# Patient Record
Sex: FEMALE | Race: WHITE | NOT HISPANIC OR LATINO | Employment: OTHER | URBAN - METROPOLITAN AREA
[De-identification: names, ages, dates, MRNs, and addresses within clinical notes are randomized per-mention and may not be internally consistent; named-entity substitution may affect disease eponyms.]

---

## 2023-05-30 ENCOUNTER — OFFICE VISIT (OUTPATIENT)
Dept: URGENT CARE | Facility: CLINIC | Age: 83
End: 2023-05-30
Payer: MEDICARE

## 2023-05-30 VITALS
WEIGHT: 135 LBS | RESPIRATION RATE: 18 BRPM | OXYGEN SATURATION: 94 % | TEMPERATURE: 98 F | DIASTOLIC BLOOD PRESSURE: 90 MMHG | HEIGHT: 63 IN | HEART RATE: 69 BPM | SYSTOLIC BLOOD PRESSURE: 126 MMHG | BODY MASS INDEX: 23.92 KG/M2

## 2023-05-30 DIAGNOSIS — R11.2 NAUSEA AND VOMITING, UNSPECIFIED VOMITING TYPE: ICD-10-CM

## 2023-05-30 DIAGNOSIS — N30.90 CYSTITIS: ICD-10-CM

## 2023-05-30 LAB
APPEARANCE UR: NORMAL
BILIRUB UR STRIP-MCNC: NEGATIVE MG/DL
COLOR UR AUTO: YELLOW
GLUCOSE UR STRIP.AUTO-MCNC: NEGATIVE MG/DL
KETONES UR STRIP.AUTO-MCNC: 15 MG/DL
LEUKOCYTE ESTERASE UR QL STRIP.AUTO: NORMAL
NITRITE UR QL STRIP.AUTO: POSITIVE
PH UR STRIP.AUTO: 6.5 [PH] (ref 5–8)
PROT UR QL STRIP: 100 MG/DL
RBC UR QL AUTO: NORMAL
SP GR UR STRIP.AUTO: 1.02
UROBILINOGEN UR STRIP-MCNC: 0.2 MG/DL

## 2023-05-30 PROCEDURE — 81002 URINALYSIS NONAUTO W/O SCOPE: CPT

## 2023-05-30 PROCEDURE — 3080F DIAST BP >= 90 MM HG: CPT

## 2023-05-30 PROCEDURE — 3074F SYST BP LT 130 MM HG: CPT

## 2023-05-30 PROCEDURE — 99203 OFFICE O/P NEW LOW 30 MIN: CPT

## 2023-05-30 RX ORDER — DONEPEZIL HYDROCHLORIDE 10 MG/1
TABLET, FILM COATED ORAL
COMMUNITY
Start: 2023-04-07 | End: 2023-06-01

## 2023-05-30 RX ORDER — ASPIRIN 81 MG/1
81 TABLET ORAL DAILY
COMMUNITY
Start: 2022-07-07 | End: 2023-05-30

## 2023-05-30 RX ORDER — ONDANSETRON 4 MG/1
4 TABLET, ORALLY DISINTEGRATING ORAL ONCE
Status: COMPLETED | OUTPATIENT
Start: 2023-05-30 | End: 2023-05-30

## 2023-05-30 RX ORDER — CARVEDILOL 12.5 MG/1
12.5 TABLET ORAL 2 TIMES DAILY
COMMUNITY
Start: 2023-04-12 | End: 2023-07-26

## 2023-05-30 RX ORDER — ONDANSETRON HYDROCHLORIDE 8 MG/1
8 TABLET, FILM COATED ORAL EVERY 8 HOURS PRN
COMMUNITY
Start: 2023-04-14 | End: 2023-07-26 | Stop reason: SDUPTHER

## 2023-05-30 RX ORDER — DONEPEZIL HYDROCHLORIDE 10 MG/1
10 TABLET, FILM COATED ORAL NIGHTLY
COMMUNITY
Start: 2023-01-31 | End: 2023-07-26

## 2023-05-30 RX ORDER — NAPROXEN 500 MG/1
500 TABLET ORAL 2 TIMES DAILY WITH MEALS
COMMUNITY
Start: 2023-05-04 | End: 2023-07-26

## 2023-05-30 RX ORDER — NAPROXEN 500 MG/1
TABLET ORAL
COMMUNITY
Start: 2023-05-16 | End: 2023-06-01

## 2023-05-30 RX ORDER — AMLODIPINE BESYLATE 5 MG/1
10 TABLET ORAL DAILY
COMMUNITY
Start: 2023-03-14 | End: 2023-06-01

## 2023-05-30 RX ORDER — OLOPATADINE HYDROCHLORIDE 1 MG/ML
1 SOLUTION/ DROPS OPHTHALMIC
COMMUNITY
Start: 2022-08-05 | End: 2023-05-30

## 2023-05-30 RX ORDER — PSEUDOEPHEDRINE HCL 30 MG
100 TABLET ORAL
COMMUNITY
Start: 2022-07-07 | End: 2023-05-30

## 2023-05-30 RX ORDER — AMITRIPTYLINE HYDROCHLORIDE 25 MG/1
25 TABLET, FILM COATED ORAL NIGHTLY
COMMUNITY
Start: 2023-04-07 | End: 2023-07-26

## 2023-05-30 RX ORDER — CEPHALEXIN 500 MG/1
500 CAPSULE ORAL 2 TIMES DAILY
Qty: 14 CAPSULE | Refills: 0 | Status: SHIPPED | OUTPATIENT
Start: 2023-05-30 | End: 2023-06-06

## 2023-05-30 RX ORDER — NYSTATIN 100000 [USP'U]/G
POWDER TOPICAL
COMMUNITY
Start: 2022-07-07 | End: 2023-05-30

## 2023-05-30 RX ORDER — AMLODIPINE BESYLATE 5 MG/1
TABLET ORAL
COMMUNITY
Start: 2023-04-20 | End: 2023-06-01

## 2023-05-30 RX ADMIN — ONDANSETRON 4 MG: 4 TABLET, ORALLY DISINTEGRATING ORAL at 14:13

## 2023-05-30 NOTE — PROGRESS NOTES
Chief Complaint   Patient presents with    Diarrhea     X1 day, vomiting        HISTORY OF PRESENT ILLNESS: Patient is a pleasant 82 y.o. female who presents to urgent care today noted ongoing abdominal pain with nausea and vomiting for the last day.  She denies any current diarrhea.  No noted fevers.  She denies any pain with urination, however she does note that she is incontinent.  Has not taken anything for any of her symptoms, ongoing history of a UTI    There are no problems to display for this patient.      Allergies:Patient has no known allergies.    Current Outpatient Medications Ordered in Epic   Medication Sig Dispense Refill    ondansetron (ZOFRAN) 8 MG Tab       naproxen (NAPROSYN) 500 MG Tab TAKE 1 TABLET(500 MG) BY MOUTH TWICE DAILY WITH MEALS      naproxen (NAPROSYN) 500 MG Tab       donepezil (ARICEPT) 10 MG tablet TAKE 1 TABLET(10 MG) BY MOUTH DAILY IN THE EVENING      donepezil (ARICEPT) 10 MG tablet       carvedilol (COREG) 12.5 MG Tab       amLODIPine (NORVASC) 5 MG Tab Take 10 mg by mouth every day.      amLODIPine (NORVASC) 5 MG Tab       cephALEXin (KEFLEX) 500 MG Cap Take 1 Capsule by mouth 2 times a day for 7 days. 14 Capsule 0    amitriptyline (ELAVIL) 25 MG Tab        No current Epic-ordered facility-administered medications on file.       History reviewed. No pertinent past medical history.    Social History     Tobacco Use    Smoking status: Never    Smokeless tobacco: Never   Vaping Use    Vaping Use: Never used   Substance Use Topics    Alcohol use: Not Currently    Drug use: Never       No family status information on file.   History reviewed. No pertinent family history.    ROS:  Review of Systems   Constitutional: Negative for fever, chills, weight loss, malaise, and fatigue.   HENT: Negative for ear pain, nosebleeds, congestion, sore throat and neck pain.    Eyes: Negative for vision changes.   Neuro: Negative for headache, sensory changes, weakness, seizure, LOC.  "  Cardiovascular: Negative for chest pain, palpitations, orthopnea and leg swelling.   Respiratory: Negative for cough, sputum production, shortness of breath and wheezing.   Gastrointestinal: Positive for abdominal pain, positive nausea, vomiting x1, negative diarrhea.   Genitourinary: Negative for dysuria, urgency and frequency.  Positive incontinence  Musculoskeletal: Negative for falls, neck pain, back pain, joint pain, myalgias.   Skin: Negative for rash, diaphoresis.     Exam:  BP (!) 126/90   Pulse 69   Temp 36.7 °C (98 °F) (Temporal)   Resp 18   Ht 1.6 m (5' 3\")   Wt 61.2 kg (135 lb)   SpO2 94%   General: well-nourished, well-developed female in NAD  Head: normocephalic, atraumatic  Eyes: PERRLA, no conjunctival injection, acuity grossly intact, lids normal.  Ears: normal shape and symmetry, no tenderness, no discharge. External canals are without any significant edema or erythema. Tympanic membranes are without any inflammation, no effusion. Gross auditory acuity is intact.  Nose: symmetrical without tenderness, no discharge.  Mouth/Throat: reasonable hygiene, no erythema, exudates or tonsillar enlargement.  Neck: no masses, range of motion within normal limits, no tracheal deviation. No obvious thyroid enlargement.   Lymph: no cervical adenopathy. No supraclavicular adenopathy.   Neuro: alert and oriented. Cranial nerves 1-12 grossly intact. No sensory deficit.   Cardiovascular: regular rate and rhythm. No edema.  Pulmonary: no distress. Chest is symmetrical with respiration, no wheezes, crackles, or rhonchi.   Abdomen: soft, non-tender, no guarding, no hepatosplenomegaly.  Urine is foul-smelling, negative CVA tenderness  Musculoskeletal: no clubbing, appropriate muscle tone, gait is stable.  Skin: warm, dry, intact, no clubbing, no cyanosis, no rashes.   Psych: appropriate mood, affect, judgement.         Assessment/Plan:  1. Nausea and vomiting, unspecified vomiting type  POCT Urinalysis    " ondansetron (ZOFRAN ODT) dispertab 4 mg      2. Cystitis  cephALEXin (KEFLEX) 500 MG Cap      This is a 82-year-old female who comes in today with ongoing complaints of nausea and vomiting for the last day.  She notes a previous history of ongoing UTIs.  She is currently incontinent of urine denies any other symptoms otherwise.  POCT urinalysis was done, shows positive leukocytes, blood, nitrates patient was treated with a course of Keflex, Zofran given here in the office today for her ongoing nausea.  Patient advised that if she does not improve or continues to get worse to please seek further evaluation, fluids were encouraged.        Supportive care, differential diagnoses, and indications for immediate follow-up discussed with patient.   Pathogenesis of diagnosis discussed including typical length and natural progression.   Instructed to return to clinic or nearest emergency department for any change in condition, further concerns, or worsening of symptoms.  Patient states understanding of the plan of care and discharge instructions.  Instructed to make an appointment, for follow up, with  primary care provider.        Please note that this dictation was created using voice recognition software. I have made every reasonable attempt to correct obvious errors, but I expect that there are errors of grammar and possibly content that I did not discover before finalizing the note.      Betty TRINIDAD

## 2023-06-01 ENCOUNTER — OFFICE VISIT (OUTPATIENT)
Dept: MEDICAL GROUP | Facility: MEDICAL CENTER | Age: 83
End: 2023-06-01
Payer: MEDICARE

## 2023-06-01 VITALS
TEMPERATURE: 98.8 F | HEIGHT: 63 IN | DIASTOLIC BLOOD PRESSURE: 70 MMHG | SYSTOLIC BLOOD PRESSURE: 130 MMHG | BODY MASS INDEX: 23.04 KG/M2 | WEIGHT: 130 LBS | OXYGEN SATURATION: 94 % | HEART RATE: 68 BPM | RESPIRATION RATE: 17 BRPM

## 2023-06-01 DIAGNOSIS — R73.03 PREDIABETES: ICD-10-CM

## 2023-06-01 DIAGNOSIS — I10 PRIMARY HYPERTENSION: ICD-10-CM

## 2023-06-01 DIAGNOSIS — N63.15 UNSPECIFIED LUMP IN THE RIGHT BREAST, OVERLAPPING QUADRANTS: ICD-10-CM

## 2023-06-01 DIAGNOSIS — Z00.00 ENCOUNTER FOR MEDICAL EXAMINATION TO ESTABLISH CARE: ICD-10-CM

## 2023-06-01 DIAGNOSIS — E78.1 PURE HYPERTRIGLYCERIDEMIA: ICD-10-CM

## 2023-06-01 DIAGNOSIS — F02.80 CIRCUMSCRIBED BRAIN ATROPHY (HCC): ICD-10-CM

## 2023-06-01 DIAGNOSIS — G31.01 CIRCUMSCRIBED BRAIN ATROPHY (HCC): ICD-10-CM

## 2023-06-01 DIAGNOSIS — F01.50 MULTI-INFARCT DEMENTIA, UNCOMPLICATED (HCC): ICD-10-CM

## 2023-06-01 DIAGNOSIS — N63.0 BREAST LUMP IN FEMALE: ICD-10-CM

## 2023-06-01 DIAGNOSIS — Z11.59 NEED FOR HEPATITIS C SCREENING TEST: ICD-10-CM

## 2023-06-01 PROBLEM — N39.0 RECURRENT UTI: Status: ACTIVE | Noted: 2022-04-26

## 2023-06-01 PROCEDURE — 3078F DIAST BP <80 MM HG: CPT | Performed by: STUDENT IN AN ORGANIZED HEALTH CARE EDUCATION/TRAINING PROGRAM

## 2023-06-01 PROCEDURE — 3075F SYST BP GE 130 - 139MM HG: CPT | Performed by: STUDENT IN AN ORGANIZED HEALTH CARE EDUCATION/TRAINING PROGRAM

## 2023-06-01 PROCEDURE — 99214 OFFICE O/P EST MOD 30 MIN: CPT | Performed by: STUDENT IN AN ORGANIZED HEALTH CARE EDUCATION/TRAINING PROGRAM

## 2023-06-01 ASSESSMENT — PATIENT HEALTH QUESTIONNAIRE - PHQ9: CLINICAL INTERPRETATION OF PHQ2 SCORE: 3

## 2023-06-01 NOTE — PROGRESS NOTES
Subjective:     CC:  Diagnoses of Encounter for medical examination to establish care, Primary hypertension, Pure hypertriglyceridemia, Multi-infarct dementia, uncomplicated (HCC), Circumscribed brain atrophy (HCC), Breast lump in female, Unspecified lump in the right breast, overlapping quadrants, Prediabetes, and Need for hepatitis C screening test were pertinent to this visit.    HISTORY OF THE PRESENT ILLNESS: Patient is a 82 y.o. female. This pleasant patient is here today to establish care and discuss the following    Problem   Primary Hypertension    This is a chronic, well-controlled condition.  She is currently taking carvedilol 12.5 mg twice daily.  In the past she has had amlodipine per chart review, but patient is not taking this right now.  No recent labs.       Prediabetes    This is a chronic condition, last checked several years ago and A1c was 5.9       Breast Lump in Female    This is a chronic condition.  She states that she has lumps in the superior left breast.  She has a history of breast cancer.  She cannot remember when she had her last mammogram.       Pure Hypertriglyceridemia    This is a chronic condition, not on any medications, labs reviewed which showed normal triglycerides about a year ago.       Circumscribed Brain Atrophy (Hcc)    This is a diagnosis that is seen on chart review.  Patient is not aware of this diagnosis.       Multi-Infarct Dementia, Uncomplicated (Hcc)    This is a chronic condition that was gathered from chart review.  In talking to patient she states that she does not have dementia and this was put on her chart by her  who is no longer alive.  She is taking Aricept but cannot tell me why she is taking this, but does deny having dementia.  Through the conversation she has forgotten previous questions.  For example she asked me at the beginning of the visit if we could do memory testing in order to get the diagnosis of dementia erased from her chart.  Later  "in the visit when I brought up coming back to do formal memory testing she stated that she did not want to do that and asked why I had brought that up, despite her being the one who asked for it.       Recurrent Uti     ROS:   ROS      Objective:     Exam: /70 (BP Location: Left arm, Patient Position: Sitting, BP Cuff Size: Adult)   Pulse 68   Temp 37.1 °C (98.8 °F) (Temporal)   Resp 17   Ht 1.6 m (5' 3\")   Wt 59 kg (130 lb)   SpO2 94%  Body mass index is 23.03 kg/m².    Physical Exam  Constitutional:       General: She is not in acute distress.     Appearance: She is not toxic-appearing.      Comments: Exam through observation   HENT:      Head: Normocephalic and atraumatic.      Right Ear: External ear normal.      Left Ear: External ear normal.   Eyes:      General:         Right eye: No discharge.         Left eye: No discharge.      Extraocular Movements: Extraocular movements intact.      Conjunctiva/sclera: Conjunctivae normal.   Pulmonary:      Effort: Pulmonary effort is normal. No respiratory distress.   Skin:     General: Skin is warm and dry.   Neurological:      Mental Status: She is alert.      Comments: Appears to have difficulty with short-term memory.  She will asked me question and then when I bring it up later in the conversation she will not remember us after discussing it previously.   Psychiatric:         Mood and Affect: Mood normal.         Behavior: Behavior normal.         Thought Content: Thought content normal.         Judgment: Judgment normal.           Assessment & Plan:   82 y.o. female with the following -    1. Encounter for medical examination to establish care  History, problem list, medications and allergies reviewed.  Records requested from previous provider if applicable.    2. Primary hypertension  Chronic, well controlled, continue Coreg, CMP pending  - Comp Metabolic Panel; Future    3. Pure hypertriglyceridemia  Chronic, well controlled with diet, lipid panel " pending  - Lipid Profile; Future    4. Multi-infarct dementia, uncomplicated (HCC)  Referral to social work for help with resources in the community as well as help with rides.  Continue donepezil.  Patient does not want to come in for formal memory testing despite the fact that she asked me if she could come in for formal memory testing.  On exam it does appear that she has dementia.  Patient does not think she has dementia.  Caretaker also does not think she has dementia but does mention that she has difficulty with word finding and explaining herself.  Caretaker has been working with her for about 3 weeks.  - REFERRAL TO CARE MANAGEMENT    5. Circumscribed brain atrophy (HCC)  Noted on chart    6. Breast lump in female  Discussed the importance of getting a mammogram since she has had a history of breast cancer and now has a lump in the right breast  - MA-DIAGNOSTIC MAMMO BILAT W/TOMOSYNTHESIS W/CAD; Future    7. Unspecified lump in the right breast, overlapping quadrants  - MA-DIAGNOSTIC MAMMO BILAT W/TOMOSYNTHESIS W/CAD; Future    8. Prediabetes  Chronic, diet controlled, recheck A1c  - HEMOGLOBIN A1C; Future    9. Need for hepatitis C screening test  - HEP C VIRUS ANTIBODY; Future      HCC Gap Form    Last edited 06/01/23 10:58 PDT by Elle Romero M.D.         No follow-ups on file.    Please note that this dictation was created using voice recognition software. I have made every reasonable attempt to correct obvious errors, but I expect that there are errors of grammar and possibly content that I did not discover before finalizing the note.

## 2023-06-05 ENCOUNTER — PATIENT OUTREACH (OUTPATIENT)
Dept: HEALTH INFORMATION MANAGEMENT | Facility: OTHER | Age: 83
End: 2023-06-05
Payer: MEDICARE

## 2023-06-05 NOTE — PROGRESS NOTES
"6-5-23  ZEKE received referral for Tracy from PCP Dr. Elle Romero for \"elderly patient with dementia, needs help with transportation\".  6-6-23  @1540 ZEKE called Tracy. Tracy Bentley's caretaker answered the phone. Sheree told ZEKE that she was in charge of setting up all of Tracy's medication appts, etc. and all that was needed at this time was help with transportation. EZKE asked Sheree to speak with Tracy to ask if she wanted her son Wilfrido to be contacted regarding her medical care. Tracy stated that yes she did want him to have access. ZEKE made the update to Tracy's chart. Sheree explained to ZEKE that Tracy needed some transportation options as Sheree's car was small. ZEKE gave Sheree the phone number for Tracy's [facility]  to arrange Uber rides for Tracy. ZEKE and Shreee discussed additional transportation resources that may be helpful. ZEKE told Sheree that ZEKE will mail additional transportation resources to Tracy's home address. ZEKE asked Sheree if there was anything else ZEKE could help with at this time. Sheree told ZEKE that was all for now. ZEKE gave Sheree ALANIS contact info for any future needs for Tracy.    @404 ZEKE mailed Tracy transportation resources.   6-7-23  @1120 ZEKE called Tracy's son, Wilfrido. ZEKE completed assessment.  @1501 ZEKE mailed Wilfrido's requested resources to: [address], [place] [address]. Resources included: assisted living facilities, therapists, volunteer organizations that offer in home visits, in home caregiver agency lists, and transportation resources, specifying which accommodate wheelchair bound. ZEKE also included adult day care resource.  @1501 ZEKE mailed Tracy an updated transportation resource which included transportation resources which accommodate wheelchair bound.  @1512 As requested, ZEKE emailed Wilfrido ALANIS contact info for any additional resources needed, [email or web address].  @1683 ZEKE sent Tracy's PCP Dr. Romero a message updating her on resources sent to Tracy and " her son Wilfrido.  @8335 ZEKE received email from Wilfrido thanking ZEKE for her help.      Social Work Assessment  Community Care Management    Synopsis: Tracy has transitioned to the [place] area from the [place] area to be close to her best friend, Kya. Tracy currently has a full time caregiver who is feeling burned out and a transition with her living situation will need to be made.     Living Situation/Home Environment: Tracy Anna's son shared with ZEKE that prior to his mom living in [place], she lived in an assisted living facility in [place] due to her being in a wheelchair and needing help with her ADL's. Wilfrido shared that since his mom's move to [place], she has had a caretaker 24/7. Wilfrido explained that the caretaker does get a break, twice a week for a few hours. Wilfrido shared with ZEKE that the current caregiver is getting burned out and no longer wants to serve in this role. Wilfrido shared with ZEKE that Tracy is not thrilled with the idea of moving back into an assisted living facility, where all her needs were taken care of, but emotionally her needs were unmet. However, Wilfrido shared with ZEKE that he does not see an alternative as the costs for round the clock care outside of a facility are prohibitively expensive.  Financial Situation/Sources of Income: Wilfrido reported no financial stress for Tracy.  Transportation: Wilfrido shared with ZEKE that Tracy has a really difficult time moving from her wheelchair to a car seat. Wilfrido shared that ideally the transportation service would allow her to stay in her wheelchair. Wilfrido further explained that Tracy has incontinence issues.   Support System: Wilfrido shared with ZEKE that Tracy has her best friend Kya and Kya's children in [place] (who are now overwhelmed helping Tracy), her son Wilfrido and his family who live in [place] and that Tracy's  has passed away.   Mental Health/Substance Abuse Hx: Wilfrido shared with ZEKE that Tracy has been diagnosed with brain atrophy. Wilfrido shared  that Tracy has good days and bad days. Wilfrido reported no substance abuse for Tracy.   Ability to Obtain Basic Resources: Wilfrido shared with ZEKE that Tracy's caretaker is helping her obtain her basic resources.  Physical Functioning: Wilfrido shared with ZEKE that it is getting very difficult for Tracy to move in and out of her wheelchair.  Patient's Perception of Needs: Wilfrido shared with ZEKE that his mom, Tracy in the past few weeks has recognized that she needs help. Wilfrido shared with ZEKE he has POA for Tracy's medical and financial decisions.  AD Discussion?: ZEKE and Wilfrido discussed Wilfrido sending Tracy's caregiver Sheree a copy of the medical POA for Sheree to upload into [employer] system at Tracy's next medical appt.    Plan: ZEKE will send Wilfrido a list of assisted living facilities, behavioral health therapists who accept Tracy's insurance, volunteer organizations that offer in home visits, in home caregiver agency lists, and transportation resources. ZEKE will inquire if there are options for Tracy to be transported in her wheelchair.    Goal:  Wilfrido will review resources and with Tracy determine best choices for care moving forward.        Social Work Care Plan        Tracy Puente's Goal:  Determine best care for Tracy moving forward  Barriers:  Current plan has Tracy's caregiver overwhelmed  Interventions: Review alternate choices     Start Date: 6/7/23  Anticipated Goal Achievement Date:  TBD        Next Scheduled patient outreach:  As Needed  Social Work Care Coordinator:  Liz Meyer  Community Care Management:  [phone]

## 2023-06-07 SDOH — ECONOMIC STABILITY: FOOD INSECURITY: WITHIN THE PAST 12 MONTHS, YOU WORRIED THAT YOUR FOOD WOULD RUN OUT BEFORE YOU GOT MONEY TO BUY MORE.: NEVER TRUE

## 2023-06-07 SDOH — ECONOMIC STABILITY: HOUSING INSECURITY
IN THE LAST 12 MONTHS, WAS THERE A TIME WHEN YOU DID NOT HAVE A STEADY PLACE TO SLEEP OR SLEPT IN A SHELTER (INCLUDING NOW)?: NO

## 2023-06-07 SDOH — ECONOMIC STABILITY: INCOME INSECURITY: IN THE LAST 12 MONTHS, WAS THERE A TIME WHEN YOU WERE NOT ABLE TO PAY THE MORTGAGE OR RENT ON TIME?: NO

## 2023-06-07 SDOH — ECONOMIC STABILITY: FOOD INSECURITY: WITHIN THE PAST 12 MONTHS, THE FOOD YOU BOUGHT JUST DIDN'T LAST AND YOU DIDN'T HAVE MONEY TO GET MORE.: NEVER TRUE

## 2023-06-07 SDOH — ECONOMIC STABILITY: TRANSPORTATION INSECURITY
IN THE PAST 12 MONTHS, HAS LACK OF TRANSPORTATION KEPT YOU FROM MEETINGS, WORK, OR FROM GETTING THINGS NEEDED FOR DAILY LIVING?: YES

## 2023-06-07 SDOH — ECONOMIC STABILITY: TRANSPORTATION INSECURITY
IN THE PAST 12 MONTHS, HAS THE LACK OF TRANSPORTATION KEPT YOU FROM MEDICAL APPOINTMENTS OR FROM GETTING MEDICATIONS?: YES

## 2023-06-07 SDOH — ECONOMIC STABILITY: HOUSING INSECURITY: IN THE LAST 12 MONTHS, HOW MANY PLACES HAVE YOU LIVED?: 2

## 2023-06-07 ASSESSMENT — SOCIAL DETERMINANTS OF HEALTH (SDOH)
IN A TYPICAL WEEK, HOW MANY TIMES DO YOU TALK ON THE PHONE WITH FAMILY, FRIENDS, OR NEIGHBORS?: ONCE A WEEK
DO YOU BELONG TO ANY CLUBS OR ORGANIZATIONS SUCH AS CHURCH GROUPS UNIONS, FRATERNAL OR ATHLETIC GROUPS, OR SCHOOL GROUPS?: NO
WITHIN THE LAST YEAR, HAVE TO BEEN RAPED OR FORCED TO HAVE ANY KIND OF SEXUAL ACTIVITY BY YOUR PARTNER OR EX-PARTNER?: NO
WITHIN THE LAST YEAR, HAVE YOU BEEN AFRAID OF YOUR PARTNER OR EX-PARTNER?: NO
HOW OFTEN DO YOU ATTEND CHURCH OR RELIGIOUS SERVICES?: NEVER
HOW OFTEN DO YOU ATTENT MEETINGS OF THE CLUB OR ORGANIZATION YOU BELONG TO?: NEVER
HOW OFTEN DO YOU GET TOGETHER WITH FRIENDS OR RELATIVES?: ONCE A WEEK
WITHIN THE LAST YEAR, HAVE YOU BEEN KICKED, HIT, SLAPPED, OR OTHERWISE PHYSICALLY HURT BY YOUR PARTNER OR EX-PARTNER?: NO
HOW HARD IS IT FOR YOU TO PAY FOR THE VERY BASICS LIKE FOOD, HOUSING, MEDICAL CARE, AND HEATING?: NOT HARD AT ALL
WITHIN THE LAST YEAR, HAVE YOU BEEN HUMILIATED OR EMOTIONALLY ABUSED IN OTHER WAYS BY YOUR PARTNER OR EX-PARTNER?: NO

## 2023-06-14 ENCOUNTER — TELEPHONE (OUTPATIENT)
Dept: HEALTH INFORMATION MANAGEMENT | Facility: OTHER | Age: 83
End: 2023-06-14

## 2023-06-22 ENCOUNTER — PATIENT OUTREACH (OUTPATIENT)
Dept: HEALTH INFORMATION MANAGEMENT | Facility: OTHER | Age: 83
End: 2023-06-22
Payer: MEDICARE

## 2023-06-22 NOTE — PROGRESS NOTES
6-22-23  @1532 ZEKE called Tracy's son, Wilfrido. ZEKE left a message with contact info.  @0674 ZEKE called Tracy. ZEKE left a message with contact info.

## 2023-06-23 ENCOUNTER — HOSPITAL ENCOUNTER (OUTPATIENT)
Dept: RADIOLOGY | Facility: MEDICAL CENTER | Age: 83
End: 2023-06-23
Payer: MEDICARE

## 2023-06-27 ENCOUNTER — HOSPITAL ENCOUNTER (OUTPATIENT)
Dept: RADIOLOGY | Facility: MEDICAL CENTER | Age: 83
End: 2023-06-27
Attending: STUDENT IN AN ORGANIZED HEALTH CARE EDUCATION/TRAINING PROGRAM
Payer: MEDICARE

## 2023-06-27 DIAGNOSIS — N63.0 BREAST LUMP IN FEMALE: ICD-10-CM

## 2023-06-27 DIAGNOSIS — N63.15 UNSPECIFIED LUMP IN THE RIGHT BREAST, OVERLAPPING QUADRANTS: ICD-10-CM

## 2023-06-27 PROCEDURE — G0279 TOMOSYNTHESIS, MAMMO: HCPCS

## 2023-07-07 ENCOUNTER — HOSPITAL ENCOUNTER (OUTPATIENT)
Dept: RADIOLOGY | Facility: MEDICAL CENTER | Age: 83
End: 2023-07-07
Attending: STUDENT IN AN ORGANIZED HEALTH CARE EDUCATION/TRAINING PROGRAM
Payer: MEDICARE

## 2023-07-07 ENCOUNTER — PATIENT OUTREACH (OUTPATIENT)
Dept: HEALTH INFORMATION MANAGEMENT | Facility: OTHER | Age: 83
End: 2023-07-07
Payer: MEDICARE

## 2023-07-07 DIAGNOSIS — N63.0 BREAST LUMP IN FEMALE: ICD-10-CM

## 2023-07-07 DIAGNOSIS — N63.15 UNSPECIFIED LUMP IN THE RIGHT BREAST, OVERLAPPING QUADRANTS: ICD-10-CM

## 2023-07-07 PROCEDURE — 76642 ULTRASOUND BREAST LIMITED: CPT | Mod: RT

## 2023-07-07 PROCEDURE — 77063 BREAST TOMOSYNTHESIS BI: CPT | Mod: 52

## 2023-07-07 NOTE — PROGRESS NOTES
Social Work Care Plan        Tracy Puente's Goal:  Reach out to [facility] SW if there are further needs, questions, or concerns   Barriers:  identified by previous Liz ALANIS- Current plan has Tracy's caregiver overwhelmed  Interventions:     [facility] ZEKE Liz was initially assigned to the referral received by PCP- elderly patient with dementia, needs help with transportation. Liz's notes states that these resources were provided. Liz is no longer in the department and this referral was transferred to this . Liz attempted to contact Tracy's son, Wilfrido on 6/22/23 and left a voicemail requesting a call back. A response was no received.    is mailing out a contact letter requesting Wilfrido reaches out to  with any further needs, questions, or concerns. If a response is not received, the contact letter does have a referral closure date for 7/21/2023.  has mailed the contact letter to Wilfrido's address documented in one of Liz's notes- [address], [place] [address]     Start Date: 7/7/2021  Anticipated Goal Achievement Date:  7/21/2023        Next Scheduled patient outreach:  Close referral- 7/21/2023  Social Work Care Coordinator:  Jennifer Delgado   [facility] Management:  [phone]

## 2023-07-12 ENCOUNTER — TELEPHONE (OUTPATIENT)
Dept: MEDICAL GROUP | Facility: MEDICAL CENTER | Age: 83
End: 2023-07-12
Payer: MEDICARE

## 2023-07-12 NOTE — TELEPHONE ENCOUNTER
----- Message from Elle Romero M.D. sent at 7/7/2023 12:38 PM PDT -----  Please let patient know that her mammogram showed fibroglandular densities but there is no evidence of malignancy and no changes since the last mammogram.  Continue with routine screening.    Thank You,  Dr. Romero

## 2023-07-12 NOTE — TELEPHONE ENCOUNTER
Phone Number Called: [phone]    Call outcome: Did not leave a detailed message. Requested patient to call back.    Message: Called patient and LVM informing to call back regarding recent mammogram results.

## 2023-07-20 ENCOUNTER — APPOINTMENT (OUTPATIENT)
Dept: RADIOLOGY | Facility: MEDICAL CENTER | Age: 83
DRG: 683 | End: 2023-07-20
Attending: EMERGENCY MEDICINE
Payer: MEDICARE

## 2023-07-20 ENCOUNTER — HOSPITAL ENCOUNTER (INPATIENT)
Facility: MEDICAL CENTER | Age: 83
LOS: 1 days | DRG: 683 | End: 2023-07-21
Attending: EMERGENCY MEDICINE | Admitting: HOSPITALIST
Payer: MEDICARE

## 2023-07-20 DIAGNOSIS — E86.0 DEHYDRATION: ICD-10-CM

## 2023-07-20 DIAGNOSIS — N17.9 AKI (ACUTE KIDNEY INJURY) (HCC): ICD-10-CM

## 2023-07-20 DIAGNOSIS — N39.0 ACUTE UTI: ICD-10-CM

## 2023-07-20 PROBLEM — R79.89 ELEVATED TROPONIN: Status: ACTIVE | Noted: 2023-07-20

## 2023-07-20 LAB
ALBUMIN SERPL BCP-MCNC: 3.8 G/DL (ref 3.2–4.9)
ALBUMIN/GLOB SERPL: 1.2 G/DL
ALP SERPL-CCNC: 84 U/L (ref 30–99)
ALT SERPL-CCNC: 11 U/L (ref 2–50)
ANION GAP SERPL CALC-SCNC: 19 MMOL/L (ref 7–16)
APPEARANCE UR: ABNORMAL
AST SERPL-CCNC: 14 U/L (ref 12–45)
BACTERIA #/AREA URNS HPF: ABNORMAL /HPF
BASOPHILS # BLD AUTO: 0.2 % (ref 0–1.8)
BASOPHILS # BLD: 0.02 K/UL (ref 0–0.12)
BILIRUB SERPL-MCNC: 0.5 MG/DL (ref 0.1–1.5)
BILIRUB UR QL STRIP.AUTO: ABNORMAL
BLOOD CULTURE HOLD CXBCH: NORMAL
BUN SERPL-MCNC: 29 MG/DL (ref 8–22)
CALCIUM ALBUM COR SERPL-MCNC: 9.8 MG/DL (ref 8.5–10.5)
CALCIUM SERPL-MCNC: 9.6 MG/DL (ref 8.4–10.2)
CHLORIDE SERPL-SCNC: 100 MMOL/L (ref 96–112)
CO2 SERPL-SCNC: 20 MMOL/L (ref 20–33)
COLOR UR: YELLOW
CREAT SERPL-MCNC: 1.81 MG/DL (ref 0.5–1.4)
EKG IMPRESSION: NORMAL
EOSINOPHIL # BLD AUTO: 0.04 K/UL (ref 0–0.51)
EOSINOPHIL NFR BLD: 0.4 % (ref 0–6.9)
EPI CELLS #/AREA URNS HPF: ABNORMAL /HPF
ERYTHROCYTE [DISTWIDTH] IN BLOOD BY AUTOMATED COUNT: 43.7 FL (ref 35.9–50)
GFR SERPLBLD CREATININE-BSD FMLA CKD-EPI: 27 ML/MIN/1.73 M 2
GLOBULIN SER CALC-MCNC: 3.2 G/DL (ref 1.9–3.5)
GLUCOSE BLD STRIP.AUTO-MCNC: 94 MG/DL (ref 65–99)
GLUCOSE BLD STRIP.AUTO-MCNC: 96 MG/DL (ref 65–99)
GLUCOSE SERPL-MCNC: 137 MG/DL (ref 65–99)
GLUCOSE UR STRIP.AUTO-MCNC: NEGATIVE MG/DL
HCT VFR BLD AUTO: 47.4 % (ref 37–47)
HGB BLD-MCNC: 14.9 G/DL (ref 12–16)
IMM GRANULOCYTES # BLD AUTO: 0.03 K/UL (ref 0–0.11)
IMM GRANULOCYTES NFR BLD AUTO: 0.3 % (ref 0–0.9)
KETONES UR STRIP.AUTO-MCNC: 40 MG/DL
LACTATE SERPL-SCNC: 1.7 MMOL/L (ref 0.5–2)
LEUKOCYTE ESTERASE UR QL STRIP.AUTO: ABNORMAL
LYMPHOCYTES # BLD AUTO: 0.99 K/UL (ref 1–4.8)
LYMPHOCYTES NFR BLD: 9.6 % (ref 22–41)
MCH RBC QN AUTO: 27.5 PG (ref 27–33)
MCHC RBC AUTO-ENTMCNC: 31.4 G/DL (ref 32.2–35.5)
MCV RBC AUTO: 87.6 FL (ref 81.4–97.8)
MICRO URNS: ABNORMAL
MONOCYTES # BLD AUTO: 0.35 K/UL (ref 0–0.85)
MONOCYTES NFR BLD AUTO: 3.4 % (ref 0–13.4)
MUCOUS THREADS #/AREA URNS HPF: ABNORMAL /HPF
NEUTROPHILS # BLD AUTO: 8.91 K/UL (ref 1.82–7.42)
NEUTROPHILS NFR BLD: 86.1 % (ref 44–72)
NITRITE UR QL STRIP.AUTO: POSITIVE
NRBC # BLD AUTO: 0 K/UL
NRBC BLD-RTO: 0 /100 WBC (ref 0–0.2)
PH UR STRIP.AUTO: 5 [PH] (ref 5–8)
PLATELET # BLD AUTO: 308 K/UL (ref 164–446)
PMV BLD AUTO: 10.2 FL (ref 9–12.9)
POTASSIUM SERPL-SCNC: 4 MMOL/L (ref 3.6–5.5)
PROT SERPL-MCNC: 7 G/DL (ref 6–8.2)
PROT UR QL STRIP: 100 MG/DL
RBC # BLD AUTO: 5.41 M/UL (ref 4.2–5.4)
RBC # URNS HPF: ABNORMAL /HPF
RBC UR QL AUTO: ABNORMAL
SODIUM SERPL-SCNC: 139 MMOL/L (ref 135–145)
SP GR UR STRIP.AUTO: >=1.03
TROPONIN T SERPL-MCNC: 27 NG/L (ref 6–19)
TROPONIN T SERPL-MCNC: 31 NG/L (ref 6–19)
WBC # BLD AUTO: 10.3 K/UL (ref 4.8–10.8)
WBC #/AREA URNS HPF: ABNORMAL /HPF

## 2023-07-20 PROCEDURE — 81001 URINALYSIS AUTO W/SCOPE: CPT

## 2023-07-20 PROCEDURE — 700111 HCHG RX REV CODE 636 W/ 250 OVERRIDE (IP): Mod: JZ | Performed by: EMERGENCY MEDICINE

## 2023-07-20 PROCEDURE — 87040 BLOOD CULTURE FOR BACTERIA: CPT | Mod: 91

## 2023-07-20 PROCEDURE — 99285 EMERGENCY DEPT VISIT HI MDM: CPT

## 2023-07-20 PROCEDURE — 700111 HCHG RX REV CODE 636 W/ 250 OVERRIDE (IP): Performed by: HOSPITALIST

## 2023-07-20 PROCEDURE — 94760 N-INVAS EAR/PLS OXIMETRY 1: CPT

## 2023-07-20 PROCEDURE — 36415 COLL VENOUS BLD VENIPUNCTURE: CPT

## 2023-07-20 PROCEDURE — 700105 HCHG RX REV CODE 258: Performed by: EMERGENCY MEDICINE

## 2023-07-20 PROCEDURE — 93005 ELECTROCARDIOGRAM TRACING: CPT | Performed by: EMERGENCY MEDICINE

## 2023-07-20 PROCEDURE — A9270 NON-COVERED ITEM OR SERVICE: HCPCS | Performed by: HOSPITALIST

## 2023-07-20 PROCEDURE — 99223 1ST HOSP IP/OBS HIGH 75: CPT | Mod: AI | Performed by: HOSPITALIST

## 2023-07-20 PROCEDURE — 770006 HCHG ROOM/CARE - MED/SURG/GYN SEMI*

## 2023-07-20 PROCEDURE — 71045 X-RAY EXAM CHEST 1 VIEW: CPT

## 2023-07-20 PROCEDURE — 82962 GLUCOSE BLOOD TEST: CPT

## 2023-07-20 PROCEDURE — 80053 COMPREHEN METABOLIC PANEL: CPT

## 2023-07-20 PROCEDURE — 700102 HCHG RX REV CODE 250 W/ 637 OVERRIDE(OP): Performed by: HOSPITALIST

## 2023-07-20 PROCEDURE — 96374 THER/PROPH/DIAG INJ IV PUSH: CPT

## 2023-07-20 PROCEDURE — 85025 COMPLETE CBC W/AUTO DIFF WBC: CPT

## 2023-07-20 PROCEDURE — 84484 ASSAY OF TROPONIN QUANT: CPT | Mod: 91

## 2023-07-20 PROCEDURE — 700105 HCHG RX REV CODE 258: Performed by: HOSPITALIST

## 2023-07-20 PROCEDURE — 83605 ASSAY OF LACTIC ACID: CPT

## 2023-07-20 RX ORDER — SODIUM CHLORIDE 9 MG/ML
1000 INJECTION, SOLUTION INTRAVENOUS ONCE
Status: COMPLETED | OUTPATIENT
Start: 2023-07-20 | End: 2023-07-20

## 2023-07-20 RX ORDER — CEFTRIAXONE 1 G/1
1000 INJECTION, POWDER, FOR SOLUTION INTRAMUSCULAR; INTRAVENOUS ONCE
Status: COMPLETED | OUTPATIENT
Start: 2023-07-20 | End: 2023-07-20

## 2023-07-20 RX ORDER — DONEPEZIL HYDROCHLORIDE 5 MG/1
10 TABLET, FILM COATED ORAL NIGHTLY
Status: DISCONTINUED | OUTPATIENT
Start: 2023-07-20 | End: 2023-07-20

## 2023-07-20 RX ORDER — POLYETHYLENE GLYCOL 3350 17 G/17G
1 POWDER, FOR SOLUTION ORAL
Status: DISCONTINUED | OUTPATIENT
Start: 2023-07-20 | End: 2023-07-21 | Stop reason: HOSPADM

## 2023-07-20 RX ORDER — DEXTROSE MONOHYDRATE 25 G/50ML
25 INJECTION, SOLUTION INTRAVENOUS
Status: DISCONTINUED | OUTPATIENT
Start: 2023-07-20 | End: 2023-07-21 | Stop reason: HOSPADM

## 2023-07-20 RX ORDER — BISACODYL 10 MG
10 SUPPOSITORY, RECTAL RECTAL
Status: DISCONTINUED | OUTPATIENT
Start: 2023-07-20 | End: 2023-07-21 | Stop reason: HOSPADM

## 2023-07-20 RX ORDER — AMITRIPTYLINE HYDROCHLORIDE 25 MG/1
25 TABLET, FILM COATED ORAL NIGHTLY
Status: DISCONTINUED | OUTPATIENT
Start: 2023-07-20 | End: 2023-07-20

## 2023-07-20 RX ORDER — HEPARIN SODIUM 5000 [USP'U]/ML
5000 INJECTION, SOLUTION INTRAVENOUS; SUBCUTANEOUS EVERY 8 HOURS
Status: DISCONTINUED | OUTPATIENT
Start: 2023-07-20 | End: 2023-07-21 | Stop reason: HOSPADM

## 2023-07-20 RX ORDER — AMOXICILLIN 250 MG
2 CAPSULE ORAL 2 TIMES DAILY
Status: DISCONTINUED | OUTPATIENT
Start: 2023-07-20 | End: 2023-07-21 | Stop reason: HOSPADM

## 2023-07-20 RX ORDER — SODIUM CHLORIDE 9 MG/ML
INJECTION, SOLUTION INTRAVENOUS CONTINUOUS
Status: DISCONTINUED | OUTPATIENT
Start: 2023-07-20 | End: 2023-07-21 | Stop reason: HOSPADM

## 2023-07-20 RX ORDER — ACETAMINOPHEN 325 MG/1
650 TABLET ORAL EVERY 6 HOURS PRN
Status: DISCONTINUED | OUTPATIENT
Start: 2023-07-20 | End: 2023-07-21 | Stop reason: HOSPADM

## 2023-07-20 RX ORDER — CARVEDILOL 6.25 MG/1
12.5 TABLET ORAL 2 TIMES DAILY
Status: DISCONTINUED | OUTPATIENT
Start: 2023-07-20 | End: 2023-07-21 | Stop reason: HOSPADM

## 2023-07-20 RX ADMIN — CEFTRIAXONE SODIUM 1000 MG: 1 INJECTION, POWDER, FOR SOLUTION INTRAMUSCULAR; INTRAVENOUS at 15:34

## 2023-07-20 RX ADMIN — CARVEDILOL 12.5 MG: 6.25 TABLET, FILM COATED ORAL at 17:29

## 2023-07-20 RX ADMIN — HEPARIN SODIUM 5000 UNITS: 5000 INJECTION, SOLUTION INTRAVENOUS; SUBCUTANEOUS at 21:38

## 2023-07-20 RX ADMIN — ACETAMINOPHEN 650 MG: 325 TABLET ORAL at 18:29

## 2023-07-20 RX ADMIN — SODIUM CHLORIDE: 9 INJECTION, SOLUTION INTRAVENOUS at 17:21

## 2023-07-20 RX ADMIN — SODIUM CHLORIDE 1000 ML: 9 INJECTION, SOLUTION INTRAVENOUS at 15:32

## 2023-07-20 ASSESSMENT — COGNITIVE AND FUNCTIONAL STATUS - GENERAL
CLIMB 3 TO 5 STEPS WITH RAILING: A LOT
MOVING TO AND FROM BED TO CHAIR: A LITTLE
DRESSING REGULAR UPPER BODY CLOTHING: A LITTLE
HELP NEEDED FOR BATHING: A LOT
STANDING UP FROM CHAIR USING ARMS: A LOT
SUGGESTED CMS G CODE MODIFIER DAILY ACTIVITY: CK
TURNING FROM BACK TO SIDE WHILE IN FLAT BAD: A LITTLE
TOILETING: A LITTLE
MOBILITY SCORE: 15
HELP NEEDED FOR BATHING: A LOT
SUGGESTED CMS G CODE MODIFIER MOBILITY: CK
DRESSING REGULAR LOWER BODY CLOTHING: A LITTLE
DRESSING REGULAR LOWER BODY CLOTHING: A LITTLE
WALKING IN HOSPITAL ROOM: A LOT
MOVING FROM LYING ON BACK TO SITTING ON SIDE OF FLAT BED: A LITTLE
TOILETING: A LOT
DAILY ACTIVITIY SCORE: 18

## 2023-07-20 ASSESSMENT — LIFESTYLE VARIABLES
AVERAGE NUMBER OF DAYS PER WEEK YOU HAVE A DRINK CONTAINING ALCOHOL: 1
CONSUMPTION TOTAL: NEGATIVE
EVER HAD A DRINK FIRST THING IN THE MORNING TO STEADY YOUR NERVES TO GET RID OF A HANGOVER: NO
TOTAL SCORE: 0
HAVE YOU EVER FELT YOU SHOULD CUT DOWN ON YOUR DRINKING: NO
TOTAL SCORE: 0
ON A TYPICAL DAY WHEN YOU DRINK ALCOHOL HOW MANY DRINKS DO YOU HAVE: 0
HOW MANY TIMES IN THE PAST YEAR HAVE YOU HAD 5 OR MORE DRINKS IN A DAY: 0
TOTAL SCORE: 0
ALCOHOL_USE: NO
EVER FELT BAD OR GUILTY ABOUT YOUR DRINKING: NO
HAVE PEOPLE ANNOYED YOU BY CRITICIZING YOUR DRINKING: NO

## 2023-07-20 ASSESSMENT — PATIENT HEALTH QUESTIONNAIRE - PHQ9
1. LITTLE INTEREST OR PLEASURE IN DOING THINGS: NOT AT ALL
2. FEELING DOWN, DEPRESSED, IRRITABLE, OR HOPELESS: NOT AT ALL
SUM OF ALL RESPONSES TO PHQ9 QUESTIONS 1 AND 2: 0

## 2023-07-20 ASSESSMENT — PAIN DESCRIPTION - PAIN TYPE: TYPE: ACUTE PAIN

## 2023-07-20 NOTE — ASSESSMENT & PLAN NOTE
With no significant hyperglycemia  I will start short acting insulin for now  Accu-Checks, hypoglycemia protocol  Adjust according to blood sugars trend

## 2023-07-20 NOTE — ED NOTES
UA sample sent to lab; purewick applied and wanda-care given; new brief in place and patient in a position of comfort - no other needs at this time

## 2023-07-20 NOTE — ED NOTES
Unable to complete med rec at this time. I left message for Son Wilfrido [phone] to obtain list of pt's medications.

## 2023-07-20 NOTE — H&P
Hospital Medicine History & Physical Note    Date of Service  7/20/2023    Primary Care Physician  Elle Romero M.D.    Consultants  None     Code Status  Full Code    Chief Complaint  Chief Complaint   Patient presents with    Other     RACHELLE HOOPER from home for concerns regarding decreased appetite x2 weeks; pt is from home, has caregiver - hx of dementia     History of Presenting Illness  Tracy Puente is a 82 y.o. female with a past medical history of prediabetes, primary hypertension and dementia who presented 7/20/2023 with reduced activity and generalized weakness.  Most of the history is obtained from emergency department physician, and patient chart as the patient has dementia and is not able to provide meaningful history.  Apparently the patient has been noticed to be less active with reduced oral intake over the past 2 weeks.  Was brought to the emergency room and was found to be having acute kidney injury and a urine tract infection.     I discussed the plan of care with emergency department physician, and the patient.    Review of Systems  Review of Systems   Unable to perform ROS: Dementia     Past Medical History   has no past medical history on file.     Surgical History   has a past surgical history that includes bunionectomy.     Family History  family history includes Breast Cancer in her sister.      Social History   reports that she quit smoking about 43 years ago. Her smoking use included cigarettes. She has never used smokeless tobacco. She reports that she does not currently use alcohol. She reports that she does not use drugs.    Allergies  No Known Allergies    Medications  Prior to Admission Medications   Prescriptions Last Dose Informant Patient Reported? Taking?   amitriptyline (ELAVIL) 25 MG Tab 7/18/2023 at pm Caregiver Yes No   Sig: Take 25 mg by mouth every evening.   carvedilol (COREG) 12.5 MG Tab 7/19/2023 at am Caregiver Yes No   Sig: Take 12.5 mg by mouth 2 times a day.    donepezil (ARICEPT) 10 MG tablet 7/19/2023 at pm Caregiver Yes No   Sig: Take 10 mg by mouth every evening.   naproxen (NAPROSYN) 500 MG Tab 7/19/2023 at am Caregiver Yes No   Sig: Take 500 mg by mouth 2 times a day with meals.   ondansetron (ZOFRAN) 8 MG Tab 7/6/2023 at unk Caregiver Yes No   Sig: Take 8 mg by mouth every 8 hours as needed for Nausea/Vomiting.      Facility-Administered Medications: None     Physical Exam  Temp:  [36.3 °C (97.3 °F)-36.5 °C (97.7 °F)] 36.4 °C (97.5 °F)  Pulse:  [68-91] 75  Resp:  [18] 18  BP: (133-180)/(57-64) 151/57  SpO2:  [91 %-95 %] 94 %  Blood Pressure : 133/59   Temperature: 36.3 °C (97.3 °F)   Pulse: 81       Pulse Oximetry: 95 %     Physical Exam  Constitutional:       General: She is not in acute distress.  HENT:      Head: Normocephalic and atraumatic.      Right Ear: External ear normal.      Left Ear: External ear normal.      Nose: No congestion or rhinorrhea.      Mouth/Throat:      Mouth: Mucous membranes are dry.      Pharynx: No oropharyngeal exudate or posterior oropharyngeal erythema.   Eyes:      General: No scleral icterus.        Right eye: No discharge.         Left eye: No discharge.      Conjunctiva/sclera: Conjunctivae normal.      Pupils: Pupils are equal, round, and reactive to light.   Cardiovascular:      Rate and Rhythm: Regular rhythm. Tachycardia present.      Heart sounds:      No friction rub. No gallop.   Pulmonary:      Effort: Pulmonary effort is normal.   Abdominal:      General: Abdomen is flat. There is no distension.      Tenderness: There is no guarding.   Musculoskeletal:         General: No swelling.      Cervical back: Neck supple. No rigidity. No muscular tenderness.      Right lower leg: No edema.      Left lower leg: No edema.   Skin:     General: Skin is dry.      Capillary Refill: Capillary refill takes 2 to 3 seconds.      Coloration: Skin is pale. Skin is not jaundiced.      Findings: No bruising or erythema.   Neurological:       Mental Status: She is alert. She is disoriented.   Psychiatric:         Mood and Affect: Mood normal.      Comments: Impaired judgment       Laboratory:  Recent Labs     07/20/23  1339   WBC 10.3   RBC 5.41*   HEMOGLOBIN 14.9   HEMATOCRIT 47.4*   MCV 87.6   MCH 27.5   MCHC 31.4*   RDW 43.7   PLATELETCT 308   MPV 10.2     Recent Labs     07/20/23  1339   SODIUM 139   POTASSIUM 4.0   CHLORIDE 100   CO2 20   GLUCOSE 137*   BUN 29*   CREATININE 1.81*   CALCIUM 9.6     Recent Labs     07/20/23  1339   ALTSGPT 11   ASTSGOT 14   ALKPHOSPHAT 84   TBILIRUBIN 0.5   GLUCOSE 137*         No results for input(s): NTPROBNP in the last 72 hours.      Recent Labs     07/20/23  1339   TROPONINT 31*     Imaging:  DX-CHEST-PORTABLE (1 VIEW)   Final Result      No evidence of acute cardiopulmonary process.        I personally reviewed patient EKG shows sinus rhythm with a rate of 73, there is no ST elevation, there is 0.5 mm ST depression in lead I, aVL, leads V4-V6.  QTc is 483.    Assessment/Plan:  Justification for Admission Status  I anticipate this patient will require at least two midnights for appropriate medical management, necessitating inpatient admission because the patient has acute urinary tract infection will require intravenous antibiotics.  In addition she has acute kidney injury will require intravenous fluids, close monitoring of volume status    * UTI (urinary tract infection)- (present on admission)  Assessment & Plan  I will start ceftriaxone, follow on cultures and sensitivities    Elevated troponin- (present on admission)  Assessment & Plan  In the indeterminate range in the setting of acute kidney injury  Denies chest pain.  EKG shows sinus rhythm with a rate of 73, there is no ST elevation, there is 0.5 mm ST depression in lead I, aVL, leads V4-V6.  QTc is 483.   Continuous cardiac monitoring    I will trend troponins  Stat EKG, troponin for chest pain or shortness of breath     Prediabetes- (present on  admission)  Assessment & Plan  With no significant hyperglycemia  I will start short acting insulin for now  Accu-Checks, hypoglycemia protocol  Adjust according to blood sugars trend     Primary hypertension- (present on admission)  Assessment & Plan  I will resume home carvedilol with hold parameters    Pure hypertriglyceridemia- (present on admission)  Assessment & Plan  Cardiac diet.       VTE prophylaxis: SCDs/TEDs and heparin ppx

## 2023-07-20 NOTE — ED PROVIDER NOTES
ER Provider Note    Scribed for Timothy Castro M.D. by Quiana Guo. 7/20/2023 12:03 PM    Primary Care Provider: Elle Romero M.D.    CHIEF COMPLAINT  Chief Complaint   Patient presents with    Other     RACHELLE HOOPER from home for concerns regarding decreased appetite x2 weeks; pt is from home, has caregiver - hx of dementia     EXTERNAL RECORDS REVIEWED  None    HPI/ROS  OUTSIDE HISTORIAN(S):  RN     LIMITATION TO HISTORY   Dementia    Tracy Puente is a 82 y.o. female with a history of dementia who presents to the Emergency Department via EMS for decreased appetite onset one week ago. Per RN, the patient was brought into the ED from home because she has decreased appetite and has not been eating or drinking in the past week. She does report feeling dehydrated and wants something to drink. She does not recall who she lives with or what year it is. She does know she is in the hospital. She does not experience any chest pain, abdominal pain, dysuria, nausea, vomiting, or diarrhea.     PAST MEDICAL HISTORY  History reviewed. No pertinent past medical history.    SURGICAL HISTORY  Past Surgical History:   Procedure Laterality Date    BUNIONECTOMY         FAMILY HISTORY  Family History   Problem Relation Age of Onset    Breast Cancer Sister     Colorectal Cancer Neg Hx     Peritoneal Cancer Neg Hx     Tubal Cancer Neg Hx     Ovarian Cancer Neg Hx     Bilateral Breast Cancer Neg Hx        SOCIAL HISTORY   reports that she quit smoking about 43 years ago. Her smoking use included cigarettes. She has never used smokeless tobacco. She reports that she does not currently use alcohol. She reports that she does not use drugs.    CURRENT MEDICATIONS  Previous Medications    AMITRIPTYLINE (ELAVIL) 25 MG TAB        CARVEDILOL (COREG) 12.5 MG TAB        DONEPEZIL (ARICEPT) 10 MG TABLET    TAKE 1 TABLET(10 MG) BY MOUTH DAILY IN THE EVENING    NAPROXEN (NAPROSYN) 500 MG TAB    TAKE 1 TABLET(500 MG) BY MOUTH TWICE DAILY WITH MEALS  "   ONDANSETRON (ZOFRAN) 8 MG TAB           ALLERGIES  Patient has no known allergies.    PHYSICAL EXAM  /59   Pulse 82   Temp 36.3 °C (97.3 °F) (Temporal)   Ht 1.6 m (5' 3\")   Wt 59 kg (130 lb 1.1 oz)   SpO2 91%   BMI 23.04 kg/m²   Constitutional: Well developed, Well nourished, No acute distress, Non-toxic appearance.   HENT: Normocephalic, Atraumatic, Dry mucous membranes, no erythema, exudates, swelling, or masses, nares patent.  Eyes: Nonicteric.  Neck: Supple, no meningismus.  Lymphatic: No lymphadenopathy noted.   Cardiovascular: Regular rate and rhythm, no gallops rubs or murmurs.  Lungs: Clear bilaterally.  Abdomen: Soft and nontender throughout.  Skin: Warm, Dry, no rash.  Genitalia: Deferred.  Rectal: Deferred.  Extremities: No edema.  Neurologic: Patient knows she is in the hospital but does not know the year. A&O x 2 at baseline, Alert, appropriate, follows commands, moving all extremities, normal speech.  Psychiatric: Affect normal.    DIAGNOSTIC STUDIES & PROCEDURES    Labs:   Results for orders placed or performed during the hospital encounter of 07/20/23   CBC WITH DIFFERENTIAL   Result Value Ref Range    WBC 10.3 4.8 - 10.8 K/uL    RBC 5.41 (H) 4.20 - 5.40 M/uL    Hemoglobin 14.9 12.0 - 16.0 g/dL    Hematocrit 47.4 (H) 37.0 - 47.0 %    MCV 87.6 81.4 - 97.8 fL    MCH 27.5 27.0 - 33.0 pg    MCHC 31.4 (L) 32.2 - 35.5 g/dL    RDW 43.7 35.9 - 50.0 fL    Platelet Count 308 164 - 446 K/uL    MPV 10.2 9.0 - 12.9 fL    Neutrophils-Polys 86.10 (H) 44.00 - 72.00 %    Lymphocytes 9.60 (L) 22.00 - 41.00 %    Monocytes 3.40 0.00 - 13.40 %    Eosinophils 0.40 0.00 - 6.90 %    Basophils 0.20 0.00 - 1.80 %    Immature Granulocytes 0.30 0.00 - 0.90 %    Nucleated RBC 0.00 0.00 - 0.20 /100 WBC    Neutrophils (Absolute) 8.91 (H) 1.82 - 7.42 K/uL    Lymphs (Absolute) 0.99 (L) 1.00 - 4.80 K/uL    Monos (Absolute) 0.35 0.00 - 0.85 K/uL    Eos (Absolute) 0.04 0.00 - 0.51 K/uL    Baso (Absolute) 0.02 0.00 - 0.12 " K/uL    Immature Granulocytes (abs) 0.03 0.00 - 0.11 K/uL    NRBC (Absolute) 0.00 K/uL   COMP METABOLIC PANEL   Result Value Ref Range    Sodium 139 135 - 145 mmol/L    Potassium 4.0 3.6 - 5.5 mmol/L    Chloride 100 96 - 112 mmol/L    Co2 20 20 - 33 mmol/L    Anion Gap 19.0 (H) 7.0 - 16.0    Glucose 137 (H) 65 - 99 mg/dL    Bun 29 (H) 8 - 22 mg/dL    Creatinine 1.81 (H) 0.50 - 1.40 mg/dL    Calcium 9.6 8.4 - 10.2 mg/dL    Correct Calcium 9.8 8.5 - 10.5 mg/dL    AST(SGOT) 14 12 - 45 U/L    ALT(SGPT) 11 2 - 50 U/L    Alkaline Phosphatase 84 30 - 99 U/L    Total Bilirubin 0.5 0.1 - 1.5 mg/dL    Albumin 3.8 3.2 - 4.9 g/dL    Total Protein 7.0 6.0 - 8.2 g/dL    Globulin 3.2 1.9 - 3.5 g/dL    A-G Ratio 1.2 g/dL   URINALYSIS (UA)    Specimen: Urine   Result Value Ref Range    Color Yellow     Character Cloudy (A)     Specific Gravity >=1.030 <1.035    Ph 5.0 5.0 - 8.0    Glucose Negative Negative mg/dL    Ketones 40 (A) Negative mg/dL    Protein 100 (A) Negative mg/dL    Bilirubin Moderate (A) Negative    Nitrite Positive (A) Negative    Leukocyte Esterase Small (A) Negative    Occult Blood Small (A) Negative    Micro Urine Req Microscopic    TROPONIN   Result Value Ref Range    Troponin T 31 (H) 6 - 19 ng/L   Blood Culture,Hold   Result Value Ref Range    Blood Culture Hold Collected    ESTIMATED GFR   Result Value Ref Range    GFR (CKD-EPI) 27 (A) >60 mL/min/1.73 m 2   URINE MICROSCOPIC (W/UA)   Result Value Ref Range    WBC 20-50 (A) /hpf    RBC 0-2 /hpf    Bacteria Many (A) None /hpf    Epithelial Cells Rare Few /hpf    Mucous Threads Few /hpf   EKG (NOW)   Result Value Ref Range    Report       [facility] Emergency Dept.    Test Date:  2023  Pt Name:    RIGO GAXIOLA                 Department: [facility]  MRN:        [id number]                      [facility]  Gender:     Female                       Technician: [id number]  :        [id number]                   Requested By:NATE   MARTINE  Order #:    [id number]                    Reading MD:    Measurements  Intervals                                Axis  Rate:       73                           P:          26  SC:         151                          QRS:        38  QRSD:       104                          T:          118  QT:         438  QTc:        483    Interpretive Statements  Sinus rhythm  Abnormal inferior Q waves  Borderline repolarization abnormality  No previous ECG available for comparison        All labs reviewed by me.    EKG:   Obtained at 12:19 PM  Normal Sinus rhythm   Rate 73  Axis normal   Repolarization abnormality intra laterally   Intervals normal   No ST segment elevation or depression.     Radiology:   The attending Emergency Physician has independently interpreted the diagnostic imaging associated with this visit and is awaiting the final reading from the radiologist, which will be displayed below.    Preliminary interpretation is a follows: Negative  Radiologist interpretation:     DX-CHEST-PORTABLE (1 VIEW)   Final Result      No evidence of acute cardiopulmonary process.           COURSE & MEDICAL DECISION MAKING    ED Observation Status? Yes; I am placing the patient in to an observation status due to a diagnostic uncertainty as well as therapeutic intensity. Patient placed in observation status at 12:07 PM, 7/20/2023.     Observation plan is as follows: Labs and reevaluation.     Upon Reevaluation, the patient's condition has: not improved; and will be escalated to hospitalization.    Patient discharged from ED Observation status at 3:04 PM (Time) 7/20/2023 (Date).     INITIAL ASSESSMENT AND PLAN  Care Narrative: This is an 82-year-old who has a history of dementia who presents with generalized weakness, dehydration, KIMBERLY, UTI.  Vitals are stable.  Patient will be admitted for fluid resuscitation antibiotics.    12:03 PM - Patient seen and examined at bedside. Discussed plan of care, including labs and imaging.  Patient agrees to the plan of care. The patient will be resuscitated with 1L NS IV. Ordered for DX-Chest, EKG, Troponin, UA, CMP, CBC w/ Diff, and Estimated GFR to evaluate her symptoms.     2:29 PM - Ordered Urine Microscopic to evaluate.    3:03 PM - Ordered Blood Culture x2 and LA to evaluate.     3:06 PM - I reviewed the patients labs and radiology results which show a UTI and acute kidney injury. Paged Hospitalist. Patient was reevaluated at bedside. Discussed lab and radiology results with the patient. The patient had the opportunity to ask any questions. The plan for hospitalization was discussed with the patient given their current presentation and diagnostic study results. Patient is understanding and agreeable to the plan for hospitalization.     3:37 PM - I discussed the patient's case and the above findings with Dr. Hightower (Hospitalist) who agrees to evaluate the patient for hospitalization.     HYDRATION: Based on the patient's presentation of Dehydration the patient was given IV fluids. IV Hydration was used because oral hydration was not adequate alone. Upon recheck following hydration, the patient was improving.    ADDITIONAL PROBLEM LIST AND DISPOSITION                 DISPOSITION AND DISCUSSIONS  I have discussed management of the patient with the following physicians and PRADEEP's: Dr. Hightower (Hospitalist)    Discussion of management with other [facility] or appropriate source(s): None     DISPOSITION:  Patient will be hospitalized by Dr. Hightower in guarded condition.     FINAL IMPRESSION   1. Dehydration    2. KIMBERLY (acute kidney injury) (HCC)    3. Acute UTI      Quiana DIAZ (Scribameya), am scribing for, and in the presence of, Timothy Castro M.D..    Electronically signed by: Quiana Guo (Annmarie), 7/20/2023    Timothy DIAZ M.D. personally performed the services described in this documentation, as scribed by Quiana Guo in my presence, and it is both accurate and complete.     The note accurately  reflects work and decisions made by me.  Timothy Castro M.D.  7/20/2023  3:20 PM

## 2023-07-20 NOTE — ASSESSMENT & PLAN NOTE
In the indeterminate range in the setting of acute kidney injury  Denies chest pain.  EKG shows sinus rhythm with a rate of 73, there is no ST elevation, there is 0.5 mm ST depression in lead I, aVL, leads V4-V6.  QTc is 483.   Continuous cardiac monitoring    I will trend troponins  Stat EKG, troponin for chest pain or shortness of breath

## 2023-07-20 NOTE — ED TRIAGE NOTES
"Chief Complaint   Patient presents with    Other     BIB [facility] from home for concerns regarding decreased appetite x2 weeks; pt is from home, has caregiver - hx of dementia     Pt family believes she is \"forgetting to eat\" per EMS  "

## 2023-07-20 NOTE — ED NOTES
Med rec complete per Pt's caregiver Sheree [phone] by phone.   Per pt's son, Pt recently moved here and has a caregiver.   Allergies reviewed with Son by phone.

## 2023-07-21 ENCOUNTER — PHARMACY VISIT (OUTPATIENT)
Dept: PHARMACY | Facility: MEDICAL CENTER | Age: 83
End: 2023-07-21
Payer: COMMERCIAL

## 2023-07-21 VITALS
SYSTOLIC BLOOD PRESSURE: 157 MMHG | HEIGHT: 62 IN | HEART RATE: 59 BPM | OXYGEN SATURATION: 98 % | DIASTOLIC BLOOD PRESSURE: 51 MMHG | TEMPERATURE: 97.7 F | BODY MASS INDEX: 23.94 KG/M2 | RESPIRATION RATE: 16 BRPM | WEIGHT: 130.07 LBS

## 2023-07-21 LAB
ALBUMIN SERPL BCP-MCNC: 2.8 G/DL (ref 3.2–4.9)
ALBUMIN/GLOB SERPL: 1.1 G/DL
ALP SERPL-CCNC: 59 U/L (ref 30–99)
ALT SERPL-CCNC: 7 U/L (ref 2–50)
ANION GAP SERPL CALC-SCNC: 14 MMOL/L (ref 7–16)
AST SERPL-CCNC: 12 U/L (ref 12–45)
BILIRUB SERPL-MCNC: 0.3 MG/DL (ref 0.1–1.5)
BUN SERPL-MCNC: 27 MG/DL (ref 8–22)
CALCIUM ALBUM COR SERPL-MCNC: 9.2 MG/DL (ref 8.5–10.5)
CALCIUM SERPL-MCNC: 8.2 MG/DL (ref 8.4–10.2)
CHLORIDE SERPL-SCNC: 108 MMOL/L (ref 96–112)
CHOLEST SERPL-MCNC: 87 MG/DL (ref 100–199)
CO2 SERPL-SCNC: 19 MMOL/L (ref 20–33)
CREAT SERPL-MCNC: 1.35 MG/DL (ref 0.5–1.4)
ERYTHROCYTE [DISTWIDTH] IN BLOOD BY AUTOMATED COUNT: 43.9 FL (ref 35.9–50)
GFR SERPLBLD CREATININE-BSD FMLA CKD-EPI: 39 ML/MIN/1.73 M 2
GLOBULIN SER CALC-MCNC: 2.5 G/DL (ref 1.9–3.5)
GLUCOSE BLD STRIP.AUTO-MCNC: 111 MG/DL (ref 65–99)
GLUCOSE BLD STRIP.AUTO-MCNC: 85 MG/DL (ref 65–99)
GLUCOSE SERPL-MCNC: 108 MG/DL (ref 65–99)
HCT VFR BLD AUTO: 37 % (ref 37–47)
HDLC SERPL-MCNC: 29 MG/DL
HGB BLD-MCNC: 11.5 G/DL (ref 12–16)
LDLC SERPL CALC-MCNC: 32 MG/DL
MAGNESIUM SERPL-MCNC: 1.5 MG/DL (ref 1.5–2.5)
MCH RBC QN AUTO: 27.6 PG (ref 27–33)
MCHC RBC AUTO-ENTMCNC: 31.1 G/DL (ref 32.2–35.5)
MCV RBC AUTO: 88.7 FL (ref 81.4–97.8)
PLATELET # BLD AUTO: 256 K/UL (ref 164–446)
PMV BLD AUTO: 10.4 FL (ref 9–12.9)
POTASSIUM SERPL-SCNC: 3.1 MMOL/L (ref 3.6–5.5)
PROT SERPL-MCNC: 5.3 G/DL (ref 6–8.2)
RBC # BLD AUTO: 4.17 M/UL (ref 4.2–5.4)
SODIUM SERPL-SCNC: 141 MMOL/L (ref 135–145)
TRIGL SERPL-MCNC: 132 MG/DL (ref 0–149)
WBC # BLD AUTO: 6.4 K/UL (ref 4.8–10.8)

## 2023-07-21 PROCEDURE — 80053 COMPREHEN METABOLIC PANEL: CPT

## 2023-07-21 PROCEDURE — 94760 N-INVAS EAR/PLS OXIMETRY 1: CPT

## 2023-07-21 PROCEDURE — A9270 NON-COVERED ITEM OR SERVICE: HCPCS | Mod: JZ | Performed by: INTERNAL MEDICINE

## 2023-07-21 PROCEDURE — 80061 LIPID PANEL: CPT

## 2023-07-21 PROCEDURE — A9270 NON-COVERED ITEM OR SERVICE: HCPCS | Performed by: HOSPITALIST

## 2023-07-21 PROCEDURE — RXMED WILLOW AMBULATORY MEDICATION CHARGE: Performed by: INTERNAL MEDICINE

## 2023-07-21 PROCEDURE — 700111 HCHG RX REV CODE 636 W/ 250 OVERRIDE (IP): Performed by: HOSPITALIST

## 2023-07-21 PROCEDURE — 51798 US URINE CAPACITY MEASURE: CPT

## 2023-07-21 PROCEDURE — 700102 HCHG RX REV CODE 250 W/ 637 OVERRIDE(OP): Performed by: HOSPITALIST

## 2023-07-21 PROCEDURE — 83735 ASSAY OF MAGNESIUM: CPT

## 2023-07-21 PROCEDURE — 700105 HCHG RX REV CODE 258: Performed by: HOSPITALIST

## 2023-07-21 PROCEDURE — 700102 HCHG RX REV CODE 250 W/ 637 OVERRIDE(OP): Mod: JZ | Performed by: INTERNAL MEDICINE

## 2023-07-21 PROCEDURE — 82962 GLUCOSE BLOOD TEST: CPT | Mod: 91

## 2023-07-21 PROCEDURE — 85027 COMPLETE CBC AUTOMATED: CPT

## 2023-07-21 PROCEDURE — 36415 COLL VENOUS BLD VENIPUNCTURE: CPT

## 2023-07-21 RX ORDER — CEPHALEXIN 250 MG/1
250 CAPSULE ORAL 2 TIMES DAILY
Qty: 6 CAPSULE | Refills: 0 | Status: ACTIVE | OUTPATIENT
Start: 2023-07-22 | End: 2023-07-25

## 2023-07-21 RX ORDER — POTASSIUM CHLORIDE 20 MEQ/1
40 TABLET, EXTENDED RELEASE ORAL ONCE
Status: COMPLETED | OUTPATIENT
Start: 2023-07-21 | End: 2023-07-21

## 2023-07-21 RX ADMIN — SODIUM CHLORIDE: 9 INJECTION, SOLUTION INTRAVENOUS at 03:30

## 2023-07-21 RX ADMIN — CEFTRIAXONE SODIUM 1000 MG: 1 INJECTION, POWDER, FOR SOLUTION INTRAMUSCULAR; INTRAVENOUS at 06:09

## 2023-07-21 RX ADMIN — CARVEDILOL 12.5 MG: 6.25 TABLET, FILM COATED ORAL at 06:14

## 2023-07-21 RX ADMIN — POTASSIUM CHLORIDE 40 MEQ: 1500 TABLET, EXTENDED RELEASE ORAL at 10:07

## 2023-07-21 NOTE — PROGRESS NOTES
Patient arrived via gurney and transferred to hospital bed via slide board. Patient A/O to self only. Patient placed on waffle overlay and bed/strip alarm. VS taken and significant for HTN - provided scheduled carvedilol. Bed left in low position with call light in reach.

## 2023-07-21 NOTE — DISCHARGE PLANNING
"HTH/SCP TCN chart review completed. Collaborated with ELLY Pedro, prior to meeting with the pt. Given CM involvement in patient discharge planning in light of patient history of dementia, TCN collaborated with CM regarding TCN needs in patient discharge planning.     As documented by CM, patient documented to be at her baseline level of function with assistance received from caregiver (please see CM note 7/21/2023 at 8:42AM).     Call placed to patient son, Wilfrido, to introduce TCN program and provide education regarding post acute levels of care and SCP plan benefits (Meds to Beds, medical uber and [facility] transitional care). Patient son verbalized understanding and stated that transportation in the community is \"very difficult for patient at this time.\" Given this, son requested transitional care through [facility] with referral sent. Son verbalized appreciation for TCN telephone call and information provided. Son advised that patient caregiver resides with caregiver and son remains available to provide assistance as necessary, but son lives in [place].     At this time, patient discharge plan is home with caregiver assistance. Noted [facility] transportation scheduled with no further TCN needs anticipated in patient discharge planning at this time. Thank you.     Completed today  [facility] referral (Y) sent    "

## 2023-07-21 NOTE — DISCHARGE PLANNING
DC Transport Scheduled    Received request at: 1008    Transport Company Scheduled:  [facility]      Scheduled Date: 07/21/2023  Scheduled Time: 1230    [facility]    Notified care team of scheduled transport via Vo[employer].     If there are any changes needed to the DC transportation scheduled, please contact [facility] at ext. [phone] between the hours of 6891-2439 Mon-Fri. If outside those hours, contact the ED Case Manager at ext. [phone].

## 2023-07-21 NOTE — PROGRESS NOTES
4 Eyes Skin Assessment Completed by Raina Acosta, RN and Jessica Long, BUBBA.    Head WDL  Ears WDL  Nose WDL  Mouth WDL  Neck WDL  Breast/Chest WDL  Shoulder Blades WDL  Spine WDL  (R) Arm/Elbow/Hand WDL  (L) Arm/Elbow/Hand WDL  Abdomen WDL  Groin WDL  Scrotum/Coccyx/Buttocks Redness and Blanching  (R) Leg WDL  (L) Leg WDL  (R) Heel/Foot/Toe WDL  (L) Heel/Foot/Toe WDL          Devices In Places Blood Pressure Cuff      Interventions In Place Waffle Overlay    Possible Skin Injury No    Pictures Uploaded Into Epic N/A  Wound Consult Placed N/A  RN Wound Prevention Protocol Ordered No

## 2023-07-21 NOTE — DISCHARGE PLANNING
Case Management Discharge Planning    Admission Date: 7/20/2023  GMLOS: 3.1  ALOS: 1    6-Clicks ADL Score: 18  6-Clicks Mobility Score: 15  PT and/or OT Eval ordered: No  Post-acute Referrals Ordered: No  Post-acute Choice Obtained: No  Has referral(s) been sent to post-acute provider:  No      Anticipated Discharge Dispo: Discharge Disposition: Discharged to home/self care (01) (patient lives at home with son and caregiver.)    DME Needed: No, has WC at home.     Action(s) Taken: Updated Provider/Nurse on Discharge Plan    Escalations Completed: Provider and Bedside RN    Medically Clear: No    Next Steps: Patient discussed during morning flash rounds with team. Patient is not MC at this time. CM called patient's son, Wilfrido Puente [phone], to discuss discharge planning. Wilfrido states he lives in [place] but his mother lives in [place] with a caregiver. He states she primarily stays in her WC due to decreased mobility and stability. He states her caregiver, Sheree Marvin, assists with transfers to and from  to chair or bed. Wilfrido would like to be notified when patient is to be discharged. And the caregiver's number is [phone] - call her to pick patient up at discharge.     Barriers to Discharge: Medical clearance    Is the patient up for discharge tomorrow: No poss today

## 2023-07-21 NOTE — PROGRESS NOTES
Bedside report received from Martín MAC. At 2030 upon entering room, pt AxO 2, oriented to self and place. Pt anxious and confused attempting to get out of bed. This RN and Raina RN attempted to reorient pt. Pt demanding to speak with her son, Wilfrido.     RN able to get in contact with son via phone, pt cooperative and with calm demeanor after speaking with son. This RN spoke with Wilfrido and updated him on POC. Admit profile completed with assistance from son via phone. Wilfrido informed this RN that he lives in [place]; so pt lives at home with assistance of a 24/7 caretaker for certain ADL's and utilizes wheelchair at home. Son verbalizes understanding of POC, all questions answered.

## 2023-07-21 NOTE — PROGRESS NOTES
Called primary caregiver Cedric to let her know that patient is going home today and to ask if she needs any accomodation for the patient in transport, cedric would like us to find mobile transfer due to patient being wheelchair bound. I let her know that we would be in contact with case management. I also let her know that prior to mobile transfer I would call her and let her know when patient will be leaving the facility. She verbalized understanding.

## 2023-07-21 NOTE — PROGRESS NOTES
Received confirmation that [facility] will be giving transport to house, called caregiver Sheree and let her know that [facility] should be picking patient up at 12:30. I also let her know that I will call her if they come sooner/later than expected. She verbalized understanding.

## 2023-07-21 NOTE — PROGRESS NOTES
0700 Received report from [facility] nurse  0900 Performed assessment   Pt A&Ox2, no complaints of pain, getting discharged today.

## 2023-07-21 NOTE — PROGRESS NOTES
Discharge instructions given and discussed, signed copy in chart. Pt verbalized understanding and all questions answered. New prescriptions discussed. Pt discharged home in stable condition escorted by [facility]/wheelchair. Personal belongings with patient. IV removed and tolerated well.

## 2023-07-24 ENCOUNTER — PATIENT OUTREACH (OUTPATIENT)
Dept: MEDICAL GROUP | Facility: MEDICAL CENTER | Age: 83
End: 2023-07-24
Payer: MEDICARE

## 2023-07-24 ENCOUNTER — PATIENT OUTREACH (OUTPATIENT)
Dept: HEALTH INFORMATION MANAGEMENT | Facility: OTHER | Age: 83
End: 2023-07-24
Payer: MEDICARE

## 2023-07-24 NOTE — PROGRESS NOTES
Transitional Care Management  TCM Outreach Date and Time: Filed (7/24/2023  9:02 AM)    Discharge Questions  Actual Discharge Date: 07/21/23  Now that you are home, how are you feeling?: Fair  Did you receive any new prescriptions?: Yes  Were you able to get them filled?: Yes  Meds to Bed or Pharmacy filled?: Meds to Bed  Do you have any questions about your current medications or new medications (Review Med Rec)?: Yes (Please explain) (pt having difficulty swallowing capsules. educated on administering with applesauce or pudding to make easier for pt. pt will try and notify if still having difficulty)  Do you have a follow up appointment scheduled with your PCP?: Yes  Appointment Date: 07/28/23  Appointment Time: 1300  Any issues or paperwork you wish to discuss with your PCP?: Yes (Please specify) (still poor appetite, though slightly improving, currently eating small amounts of chicken,rice, applesauce, breads. education provided, pt would like pcp recommendation on any treatment options)  Does this patient qualify for the CCM program?: No    Transitional Care  Number of attempts made to contact patient: 1  Current or previous attempts competed within two business days of discharge? : Yes  Provided education regarding treatment plan, medications, self-management, ADLs?: Yes  Has patient completed an Advanced Directive?: No  Has the Care Manager's phone number provided?: No  Is there anything else I can help you with?: No    Discharge Summary  Chief Complaint: other - BIB [facility] from home for concerns regarding decreased appetite x2 weeks; pt is from home, has caregiver - hx of dementia  Admitting Diagnosis: deydration, KIMBERLY, UTI  Discharge Diagnosis: KIMBERLY, acute UTI, dehydration

## 2023-07-24 NOTE — PROGRESS NOTES
Social Work Care Plan        Tracy Puente's Goal:  Goal not established by this SW   Barriers:  Unable to contact   Interventions:     Liz ALANIS initially assigned to Tracy. Liz was able to make contact with Tracy's son on 6/5/2023 and provided resources. Liz attempted to follow up again on 6/22/2023 and left a voicemail. A call was not returned. Liz is no longer part of the CCM department and Tracy was assigned to this SW. SW attempted to call Tracy's son, Wilfrido for any further needs, questions, or concerns and there was no answer. ZEKE then mailed out a contact letter. SW did not receive a response upon the referral closure date that was provided on the letter for 7/21/2023. SW closing referral and will no longer continue to follow.      Start Date: N/A  Anticipated Goal Achievement Date:  Referral closed.         Next Scheduled patient outreach:  Referral closed.   Social Work Care Coordinator:  Jennifer Delgado   [facility] Care Management:  [phone]

## 2023-07-25 LAB
BACTERIA BLD CULT: NORMAL
BACTERIA BLD CULT: NORMAL
SIGNIFICANT IND 70042: NORMAL
SIGNIFICANT IND 70042: NORMAL
SITE SITE: NORMAL
SITE SITE: NORMAL
SOURCE SOURCE: NORMAL
SOURCE SOURCE: NORMAL

## 2023-07-26 PROBLEM — R63.0 POOR APPETITE: Status: ACTIVE | Noted: 2023-07-26

## 2023-07-26 PROBLEM — G47.09 OTHER INSOMNIA: Status: ACTIVE | Noted: 2023-07-26

## 2023-07-26 PROBLEM — R53.81 DEBILITY: Status: ACTIVE | Noted: 2023-07-26

## 2023-07-26 PROBLEM — R11.0 NAUSEA: Status: ACTIVE | Noted: 2023-07-26

## 2023-07-26 PROBLEM — N18.32 STAGE 3B CHRONIC KIDNEY DISEASE: Status: ACTIVE | Noted: 2023-07-26

## 2023-07-26 PROBLEM — R13.19 OTHER DYSPHAGIA: Status: ACTIVE | Noted: 2023-07-26

## 2023-07-27 ENCOUNTER — HOME HEALTH ADMISSION (OUTPATIENT)
Dept: HOME HEALTH SERVICES | Facility: HOME HEALTHCARE | Age: 83
End: 2023-07-27
Payer: MEDICARE

## 2023-08-03 ENCOUNTER — HOME CARE VISIT (OUTPATIENT)
Dept: HOME HEALTH SERVICES | Facility: HOME HEALTHCARE | Age: 83
End: 2023-08-03
Payer: MEDICARE

## 2023-08-03 VITALS
RESPIRATION RATE: 16 BRPM | HEART RATE: 90 BPM | OXYGEN SATURATION: 91 % | SYSTOLIC BLOOD PRESSURE: 118 MMHG | TEMPERATURE: 98.5 F | DIASTOLIC BLOOD PRESSURE: 68 MMHG

## 2023-08-03 PROCEDURE — 665001 SOC-HOME HEALTH

## 2023-08-03 PROCEDURE — G0493 RN CARE EA 15 MIN HH/HOSPICE: HCPCS

## 2023-08-03 ASSESSMENT — ENCOUNTER SYMPTOMS
VOMITING: DENIES
HIGHEST PAIN SEVERITY IN PAST 24 HOURS: 0/10
PAIN SEVERITY GOAL: 0/10
PERSON REPORTING PAIN: PATIENT
LOWEST PAIN SEVERITY IN PAST 24 HOURS: 0/10
LAST BOWEL MOVEMENT: 66686
DENIES PAIN: 1
NAUSEA: DENIES AT THIS TIME
DIFFICULTY THINKING: 1
POOR JUDGMENT: 1

## 2023-08-03 ASSESSMENT — ACTIVITIES OF DAILY LIVING (ADL)
HOUSEKEEPING ASSESSED: 1
TOILETING: 1
CURRENT_FUNCTION: TWO PERSON
BATHING_CURRENT_FUNCTION: MAXIMUM ASSIST
AMBULATION ASSISTANCE: 1
LIGHT HOUSEKEEPING: DEPENDENT
TOILETING: MAXIMUM ASSIST
DRESSING_LB_CURRENT_FUNCTION: MAXIMUM ASSIST
GROOMING_CURRENT_FUNCTION: MINIMUM ASSIST
TRANSPORTATION ASSESSED: 1
GROOMING_CURRENT_FUNCTION: MODERATE ASSIST
BATHING ASSESSED: 1
USING THE TELPHONE: NEEDS ASSISTANCE
CURRENT_FUNCTION: MAXIMUM ASSIST
BATHING_CURRENT_FUNCTION: TWO PERSON
TRANSPORTATION: DEPENDENT
LAUNDRY ASSESSED: 1
GROOMING ASSESSED: 1
FEEDING: SUPERVISION
SHOPPING ASSESSED: 1
SHOPPING: DEPENDENT
DRESSING_UB_CURRENT_FUNCTION: MAXIMUM ASSIST
ORAL_CARE_CURRENT_FUNCTION: NEEDS ASSISTANCE
TOILETING: TWO PERSON
PREPARING MEALS: DEPENDENT
ORAL_CARE_ASSESSED: 1
TELEPHONE USE ASSESSED: 1
LAUNDRY: DEPENDENT
AMBULATION ASSISTANCE: MAXIMUM ASSIST
FEEDING ASSESSED: 1
PHYSICAL TRANSFERS ASSESSED: 1

## 2023-08-04 ENCOUNTER — DOCUMENTATION (OUTPATIENT)
Dept: MEDICAL GROUP | Facility: PHYSICIAN GROUP | Age: 83
End: 2023-08-04
Payer: MEDICARE

## 2023-08-04 NOTE — NURSING NOTE
DIAGNOSIS AND MEDICAL HISTORY:   SOC to  for UTI prevention and non-ambulatory. Admitted from Primary Care Provider.  Chief compliant / Relevant medical history / Short Clinical Summary:  admitting Dx: acute cystitis without hematuria.  PMH: Stage 3b CKD, UTIs, poor appetite, nausea, debility, dysphagia, and HTN, and dementia.  Mentation: Alert, Oriented to person and situation.  Unable to states year, month, or city.  Recently moved from [place], [place].  Date Discharged from Hospital/Rehab/SNF: 7/27/23  Names of physicians involved: YEFRI Tavera  LIVING SITUATION AND CAREGIVING:   Homebound Status: difficulty with ambulation/transfers, needs assistance to leave home, needs assistive devices to ambulate, unable to manage stairs, unsafe to drive or leave residence without assistance, weakness and fatigue, confined to bed/chair, impaired thoughts/judgment and non-ambulatory  Type of Home: single family, mobile home [place] mobile park  Lives with: Caregivers  Receives Assistance: from caregivers  Unresolved Safety Concerns: none  Does the patient have an Advanced Directive?    Yes, copy provided or photographed for chart  Does the patient have a POLST?     No POLST, provided form and information.  REASON FOR HOME HEALTH/F2F SUPPORTING INFORMATION:  Skilled Nursing to assess and teach the following but not limited to: Medications/High Risk, Fall prevention, hydration, nutrition, infection control and S&S, Home safety.  PRIOR LEVEL OF FUNCTION:   Ambulation and Mobility: Max Assist  Patient Equipment: wheelchair for ambulation.   CURRENT LEVEL OF FUNCTION:   Ambulation and Mobility: Max Assist  Patient Equipment: wheelchair for ambulation.   Transferring: Max Assist  Bathing: Shower and max assist  Dressing: Max Assist  Special equipment used: DME  MEDICATION MANAGEMENT:  Patient uses pharmacy:   administered by another person  Medication issues:

## 2023-08-04 NOTE — CASE COMMUNICATION
Primary dx/Skilled need: HH admitting Dx: acute cystitis without hematuria.  PMH: Stage 3b CKD, UTIs, poor appetite, nausea, debility, dysphagia, and HTN, and dementia.  SN frequency: 1w1, 2w4, 1w4  Zip code: [address]  Disciplines ordered: SN, PT, OT, SLP  Insurance and authorization: [facility]  Certification period: 8/3/23 - 10/1/23  Special considerations: Orientation to place is possibly affected by pt recent relocation to the area from [place], [place].  Pt has paid live in caregivers.

## 2023-08-04 NOTE — CASE COMMUNICATION
Drug-Drug: ondansetron and mirtazapine   Additive serotonergic effects may occur during coadministration of ondansetron and mirtazapine, and the risk of developing serotonin syndrome may be increased.   Details Moderate   ondansetron (ZOFRAN) 8 MG Tab     mirtazapine (REMERON) 7.5 MG tablet   Drug-Food  <jscript:void(0)>  Drug-Food: celecoxib   Food may inhibit the absorption of celecoxib and lower the peak concentration.   Details Eladio r   celecoxib (CELEBREX) 100 MG Cap   Drug-Alcohol  <jscript:void(0)>  Drug-Alcohol: mirtazapine   The CNS depressant effects of mirtazapine and ethanol may be increased. Excessive sedation and impaired psychomotor function may occur. In addition, co-ingestion of alcohol with oral extended release dosage forms of morphine, oxymorphone, and hydromorphone may result in increased plasma concentrations of the narcotic and the potential of f atal overdose situations.   Details Moderate   mirtazapine (REMERON) 7.5 MG tablet

## 2023-08-07 ENCOUNTER — HOME CARE VISIT (OUTPATIENT)
Dept: HOME HEALTH SERVICES | Facility: HOME HEALTHCARE | Age: 83
End: 2023-08-07
Payer: MEDICARE

## 2023-08-07 PROCEDURE — G0151 HHCP-SERV OF PT,EA 15 MIN: HCPCS

## 2023-08-07 NOTE — Clinical Note
PT evaluation completed, requesting follow up visits with frquency of 1w4 effective week of 08/07/2023.

## 2023-08-07 NOTE — CASE COMMUNICATION
noted  ----- Message -----  From: Zaria Stout R.N.  Sent: 8/3/2023   7:31 PM PDT  To: Adriane Padilla R.N.; Nani Guillaume R.N.; *      Primary dx/Skilled need: HH admitting Dx: acute cystitis without hematuria.  PMH: Stage 3b CKD, UTIs, poor appetite, nausea, debility, dysphagia, and HTN, and dementia.  SN frequency: 1w1, 2w4, 1w4  Zip code: [address]  Disciplines ordered: SN, PT, OT, SLP  Insurance and authorization: [facility]  Certifica tion period: 8/3/23 - 10/1/23  Special considerations: Orientation to place is possibly affected by pt recent relocation to the area from [place], [place].  Pt has paid live in caregivers.

## 2023-08-08 ENCOUNTER — HOME CARE VISIT (OUTPATIENT)
Dept: HOME HEALTH SERVICES | Facility: HOME HEALTHCARE | Age: 83
End: 2023-08-08
Payer: MEDICARE

## 2023-08-08 VITALS
TEMPERATURE: 97.6 F | DIASTOLIC BLOOD PRESSURE: 70 MMHG | SYSTOLIC BLOOD PRESSURE: 130 MMHG | RESPIRATION RATE: 18 BRPM | OXYGEN SATURATION: 99 % | HEART RATE: 88 BPM

## 2023-08-08 VITALS
RESPIRATION RATE: 16 BRPM | OXYGEN SATURATION: 94 % | DIASTOLIC BLOOD PRESSURE: 70 MMHG | HEART RATE: 88 BPM | TEMPERATURE: 97.9 F | SYSTOLIC BLOOD PRESSURE: 135 MMHG

## 2023-08-08 PROBLEM — M13.0 POLYARTHRITIS: Status: ACTIVE | Noted: 2023-08-08

## 2023-08-08 PROCEDURE — G0493 RN CARE EA 15 MIN HH/HOSPICE: HCPCS

## 2023-08-08 ASSESSMENT — ACTIVITIES OF DAILY LIVING (ADL)
CURRENT_FUNCTION: TWO PERSON
AMBULATION ASSISTANCE: NON-AMBULATORY
AMBULATION ASSISTANCE: NON-AMBULATORY
CURRENT_FUNCTION: TWO PERSON

## 2023-08-08 ASSESSMENT — ENCOUNTER SYMPTOMS
STOOL FREQUENCY: DAILY
PERSON REPORTING PAIN: PATIENT
MUSCLE WEAKNESS: 1
DENIES PAIN: 1
LAST BOWEL MOVEMENT: 66693
LIMITED RANGE OF MOTION: 1
DIFFICULTY THINKING: 1
DENIES PAIN: 1
MUSCLE WEAKNESS: 1
BOWEL INCONTINENCE: 1
ARTHRALGIAS: 1
POOR JUDGMENT: 1

## 2023-08-10 ENCOUNTER — HOSPITAL ENCOUNTER (OUTPATIENT)
Facility: MEDICAL CENTER | Age: 83
End: 2023-08-10
Attending: PHYSICIAN ASSISTANT
Payer: MEDICARE

## 2023-08-10 ENCOUNTER — HOME CARE VISIT (OUTPATIENT)
Dept: HOME HEALTH SERVICES | Facility: HOME HEALTHCARE | Age: 83
End: 2023-08-10
Payer: MEDICARE

## 2023-08-10 LAB
ANION GAP SERPL CALC-SCNC: 14 MMOL/L (ref 7–16)
APPEARANCE UR: ABNORMAL
BACTERIA #/AREA URNS HPF: ABNORMAL /HPF
BILIRUB UR QL STRIP.AUTO: ABNORMAL
BUN SERPL-MCNC: 18 MG/DL (ref 8–22)
CALCIUM SERPL-MCNC: 9.2 MG/DL (ref 8.5–10.5)
CAOX CRY #/AREA URNS HPF: ABNORMAL /HPF
CHLORIDE SERPL-SCNC: 103 MMOL/L (ref 96–112)
CO2 SERPL-SCNC: 22 MMOL/L (ref 20–33)
COLOR UR: ABNORMAL
CREAT SERPL-MCNC: 1.52 MG/DL (ref 0.5–1.4)
EPI CELLS #/AREA URNS HPF: ABNORMAL /HPF
ERYTHROCYTE [DISTWIDTH] IN BLOOD BY AUTOMATED COUNT: 47.6 FL (ref 35.9–50)
GFR SERPLBLD CREATININE-BSD FMLA CKD-EPI: 33 ML/MIN/1.73 M 2
GLUCOSE SERPL-MCNC: 153 MG/DL (ref 65–99)
GLUCOSE UR STRIP.AUTO-MCNC: NEGATIVE MG/DL
HCT VFR BLD AUTO: 43.4 % (ref 37–47)
HGB BLD-MCNC: 13.6 G/DL (ref 12–16)
HYALINE CASTS #/AREA URNS LPF: ABNORMAL /LPF
KETONES UR STRIP.AUTO-MCNC: NEGATIVE MG/DL
LEUKOCYTE ESTERASE UR QL STRIP.AUTO: ABNORMAL
MCH RBC QN AUTO: 27.5 PG (ref 27–33)
MCHC RBC AUTO-ENTMCNC: 31.3 G/DL (ref 32.2–35.5)
MCV RBC AUTO: 87.7 FL (ref 81.4–97.8)
MICRO URNS: ABNORMAL
NITRITE UR QL STRIP.AUTO: POSITIVE
PH UR STRIP.AUTO: 5.5 [PH] (ref 5–8)
PLATELET # BLD AUTO: 322 K/UL (ref 164–446)
PMV BLD AUTO: 10.3 FL (ref 9–12.9)
POTASSIUM SERPL-SCNC: 3.3 MMOL/L (ref 3.6–5.5)
PROT UR QL STRIP: 100 MG/DL
RBC # BLD AUTO: 4.95 M/UL (ref 4.2–5.4)
RBC # URNS HPF: ABNORMAL /HPF
RBC UR QL AUTO: ABNORMAL
SODIUM SERPL-SCNC: 139 MMOL/L (ref 135–145)
SP GR UR STRIP.AUTO: >=1.03
TSH SERPL DL<=0.005 MIU/L-ACNC: 1.51 UIU/ML (ref 0.38–5.33)
UROBILINOGEN UR STRIP.AUTO-MCNC: 0.2 MG/DL
WBC # BLD AUTO: 7.3 K/UL (ref 4.8–10.8)
WBC #/AREA URNS HPF: ABNORMAL /HPF

## 2023-08-10 PROCEDURE — G0153 HHCP-SVS OF S/L PATH,EA 15MN: HCPCS

## 2023-08-10 PROCEDURE — G0299 HHS/HOSPICE OF RN EA 15 MIN: HCPCS

## 2023-08-10 PROCEDURE — 81001 URINALYSIS AUTO W/SCOPE: CPT

## 2023-08-10 PROCEDURE — 84443 ASSAY THYROID STIM HORMONE: CPT

## 2023-08-10 PROCEDURE — G0152 HHCP-SERV OF OT,EA 15 MIN: HCPCS

## 2023-08-10 PROCEDURE — 80048 BASIC METABOLIC PNL TOTAL CA: CPT

## 2023-08-10 PROCEDURE — 85027 COMPLETE CBC AUTOMATED: CPT

## 2023-08-10 ASSESSMENT — ENCOUNTER SYMPTOMS
FATIGUES EASILY: 1
DENIES PAIN: 1
DRY SKIN: 1
BOWEL PATTERN NORMAL: 1
DENIES PAIN: 1
PERSON REPORTING PAIN: PATIENT
POOR JUDGMENT: 1
PERSON REPORTING PAIN: PATIENT
DIFFICULTY THINKING: 1

## 2023-08-10 ASSESSMENT — ACTIVITIES OF DAILY LIVING (ADL)
CURRENT_FUNCTION: ONE PERSON
AMBULATION ASSISTANCE: ONE PERSON

## 2023-08-10 ASSESSMENT — PAIN SCALES - PAIN ASSESSMENT IN ADVANCED DEMENTIA (PAINAD)
NEGVOCALIZATION: 0 - NONE.
BODYLANGUAGE: 0 - RELAXED.
FACIALEXPRESSION: 0 - SMILING OR INEXPRESSIVE.
CONSOLABILITY: 0 - NO NEED TO CONSOLE.
TOTALSCORE: 0

## 2023-08-10 NOTE — Clinical Note
pt sitted in a wheelchair when sn came. alert and oriented x2, interactive and ciipertiv . she .cannot remember what place she is now. she told this nurse that she bought this house, and has caregiver and her best friend lives closed by. . 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 she also told this nurse that she wants to walk again. . caregiver reported that she still need to  new med. protonix,  states pt 's appetitie slighly improving ,needs reminders to drink water. instructed on UTI prevention ,pressure ulcer prevention and fall precautions..informed of lab draw and she agreed. venipuncture to left anticubital area done x1. pressure dressing appled. no active bleeding. urine specimen already collected by caregiver.. [facility]  [facility]. Pt/Cg response to the services provided: tolerated lab draw .

## 2023-08-10 NOTE — CASE COMMUNICATION
noted  ----- Message -----  From: Rosendo Finley, PT  Sent: 8/8/2023   8:20 PM PDT  To: Adriane Padilla R.N.; Donal Durán; *      PT evaluation completed, requesting follow up visits with frquency of 1w4 effective week of 08/07/2023.

## 2023-08-11 VITALS
DIASTOLIC BLOOD PRESSURE: 72 MMHG | TEMPERATURE: 98.8 F | SYSTOLIC BLOOD PRESSURE: 124 MMHG | OXYGEN SATURATION: 99 % | HEART RATE: 90 BPM | RESPIRATION RATE: 18 BRPM

## 2023-08-11 ASSESSMENT — ENCOUNTER SYMPTOMS
LAST BOWEL MOVEMENT: 66695
MUSCLE WEAKNESS: 1
NAUSEA: DENIES NAUSEA
PERSON REPORTING PAIN: PATIENT
POOR JUDGMENT: 1
DIFFICULTY THINKING: 1
LIMITED RANGE OF MOTION: 1
RESPIRATORY SYMPTOMS COMMENTS: DENIES
DENIES PAIN: 1

## 2023-08-11 ASSESSMENT — ACTIVITIES OF DAILY LIVING (ADL)
GROOMING ASSESSED: 1
PHYSICAL TRANSFERS ASSESSED: 1
FEEDING ASSESSED: 1
ORAL_CARE_ASSESSED: 1
FEEDING: STAND BY ASSIST
FEEDING_COMMENTS: SET UP
CURRENT_FUNCTION: TWO PERSON
CURRENT_FUNCTION: MAXIMUM ASSIST
DRESSING_LB_CURRENT_FUNCTION: MAXIMUM ASSIST
GROOMING_CURRENT_FUNCTION: STAND BY ASSIST
DRESSING_UB_CURRENT_FUNCTION: MAXIMUM ASSIST
ORAL_CARE_CURRENT_FUNCTION: NEEDS ASSISTANCE

## 2023-08-11 ASSESSMENT — PAIN SCALES - PAIN ASSESSMENT IN ADVANCED DEMENTIA (PAINAD)
TOTALSCORE: 0
BODYLANGUAGE: 0 - RELAXED.
FACIALEXPRESSION: 0 - SMILING OR INEXPRESSIVE.
CONSOLABILITY: 0 - NO NEED TO CONSOLE.
NEGVOCALIZATION: 0 - NONE.

## 2023-08-11 NOTE — CASE COMMUNICATION
noted  ----- Message -----  From: Trish Campos, INGRID  Sent: 8/11/2023   7:51 AM PDT  To: Adriane Padilla R.N.; Lisbet Sharp, SLP; *      OT evaluation completed 8/10/23. Requesting auth for 1w4 for caregiver training.

## 2023-08-12 NOTE — CASE COMMUNICATION
noted  ----- Message -----  From: Ashley Schaffer R.N.  Sent: 8/11/2023   1:13 PM PDT  To: Adriane Padilla R.N.; Nani Guillaume R.N.       pt sitted in a wheelchair when sn came. alert and oriented x2, interactive and ciipertiv . she .cannot remember what place she is now. she told this nurse that she bought this house, and has caregiver and her best friend lives closed by. . 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 she also told this nurse that she wants to walk again. . caregiver reported that she still need to  new med. protonix,  states pt 's appetitie slighly improving ,needs reminders to drink water. instructed on UTI prevention ,pressure ulcer prevention and fall precautions..informed of lab draw and she agreed. venipuncture to left anticubital area d one x1. pressure dressing appled. no active bleeding. urine specimen already collected by caregiver.. specimens tansported to  [facility] lab. Pt/Cg response to the services provided: tolerated lab draw .

## 2023-08-14 NOTE — CASE COMMUNICATION
noted  ----- Message -----  From: Lisbet Sharp, SLP  Sent: 8/13/2023   5:10 PM PDT  To: Adriane Padilla R.N.; Donal Durán      Please request auth ST  1w3, effective 8/10/23.

## 2023-08-15 ENCOUNTER — HOME CARE VISIT (OUTPATIENT)
Dept: HOME HEALTH SERVICES | Facility: HOME HEALTHCARE | Age: 83
End: 2023-08-15
Payer: MEDICARE

## 2023-08-15 VITALS — TEMPERATURE: 98.5 F | SYSTOLIC BLOOD PRESSURE: 140 MMHG | OXYGEN SATURATION: 90 % | DIASTOLIC BLOOD PRESSURE: 70 MMHG

## 2023-08-15 PROCEDURE — G0151 HHCP-SERV OF PT,EA 15 MIN: HCPCS

## 2023-08-15 PROCEDURE — G0495 RN CARE TRAIN/EDU IN HH: HCPCS

## 2023-08-15 ASSESSMENT — ENCOUNTER SYMPTOMS
MUSCLE WEAKNESS: 1
POOR JUDGMENT: 1
STOOL FREQUENCY: LESS THAN DAILY
DIFFICULTY THINKING: 1
LAST BOWEL MOVEMENT: 66700
DIFFICULTY THINKING: 1
POOR JUDGMENT: 1
DENIES PAIN: 1
DENIES PAIN: 1
BOWEL PATTERN NORMAL: 1
PERSON REPORTING PAIN: PATIENT

## 2023-08-15 ASSESSMENT — ACTIVITIES OF DAILY LIVING (ADL)
AMBULATION ASSISTANCE: ONE PERSON
CURRENT_FUNCTION: ONE PERSON

## 2023-08-16 ENCOUNTER — HOME CARE VISIT (OUTPATIENT)
Dept: HOME HEALTH SERVICES | Facility: HOME HEALTHCARE | Age: 83
End: 2023-08-16
Payer: MEDICARE

## 2023-08-16 VITALS
SYSTOLIC BLOOD PRESSURE: 118 MMHG | RESPIRATION RATE: 16 BRPM | OXYGEN SATURATION: 95 % | TEMPERATURE: 98.9 F | DIASTOLIC BLOOD PRESSURE: 70 MMHG | HEART RATE: 91 BPM

## 2023-08-16 PROCEDURE — G0153 HHCP-SVS OF S/L PATH,EA 15MN: HCPCS

## 2023-08-16 ASSESSMENT — ACTIVITIES OF DAILY LIVING (ADL): OASIS_M1830: 06

## 2023-08-16 NOTE — CASE COMMUNICATION
Quality Review for SOC OASIS by IZABELLA Harvey, RN on  August 15, 2023     Edits completed by IZABELLA Harvey RN:  1.  and  diagnosis coding updated per chart review.  2.  is 8/3/23 per the [facility] request  3.  is short stay acute hospital,  is 7/21/23, per EPIC  4. Per SN note dated 8/8/23,  is 4;  is 4.   added #5,  changed to 4. is 8/8/23 per collaboration convention  5. Per responses  to [id number] and [id number] questions stating patient is dependent on all questions, changed , , , , ,  to dependent; Changed [id number] I, M and P to 88, not attempted  6. Activities permitted unchecked walker as patient is chairbound  7.  is 10 per care plan therapy sets

## 2023-08-17 ENCOUNTER — HOME CARE VISIT (OUTPATIENT)
Dept: HOME HEALTH SERVICES | Facility: HOME HEALTHCARE | Age: 83
End: 2023-08-17
Payer: MEDICARE

## 2023-08-17 PROCEDURE — G0152 HHCP-SERV OF OT,EA 15 MIN: HCPCS

## 2023-08-18 VITALS
TEMPERATURE: 98 F | HEART RATE: 100 BPM | RESPIRATION RATE: 16 BRPM | SYSTOLIC BLOOD PRESSURE: 122 MMHG | DIASTOLIC BLOOD PRESSURE: 70 MMHG | OXYGEN SATURATION: 94 %

## 2023-08-18 ASSESSMENT — ENCOUNTER SYMPTOMS
DENIES PAIN: 1
PERSON REPORTING PAIN: PATIENT
DIFFICULTY THINKING: 1

## 2023-08-19 ENCOUNTER — HOME CARE VISIT (OUTPATIENT)
Dept: HOME HEALTH SERVICES | Facility: HOME HEALTHCARE | Age: 83
End: 2023-08-19
Payer: MEDICARE

## 2023-08-19 NOTE — Clinical Note
Received a call through answering service from patient's caregiver Aarti, states patient is on an antibiotic to treat infection, reports patient has been experiencing itching on both arms since starting antibiotic and is scratching so much it is causing bruising, Aarti asked if she can stop antibiotic. SN instructed Aarti to call on call provider with [facility] to inquire about stopping or changing antibiotic, provided phone number, Aarti states next dose due in AM and will call prior to administering next dose.

## 2023-08-19 NOTE — CASE COMMUNICATION
Reviewed and approved of edits.  ----- Message -----  From: Gabby Harvey R.N.  Sent: 8/16/2023   3:10 PM PDT  To: Zaria Stout R.N.         Quality Review for SOC OASIS by IZABELLA Harvey, BUBBA on  August 15, 2023     Edits completed by IZABELLA Harvey RN:  1.  and  diagnosis coding updated per chart review.  2.  is 8/3/23 per the [facility] request  3.  is short stay acute hospital,  is 7/21/23, per EPIC  4 . Per SN note dated 8/8/23,  is 4;  is 4.   added #5,  changed to 4. is 8/8/23 per collaboration convention  5. Per responses to [id number] and [id number] questions stating patient is dependent on all questions, changed , , , , ,  to dependent; Changed [id number] I, M and P to 88, not attempted  6. Activities permitted unchecked walker as patient is chairbound  7.  is 10 per care plan ther tracy sets

## 2023-08-20 ASSESSMENT — ENCOUNTER SYMPTOMS
DENIES PAIN: 1
DIFFICULTY THINKING: 1
PERSON REPORTING PAIN: PATIENT

## 2023-08-22 ENCOUNTER — HOME CARE VISIT (OUTPATIENT)
Dept: HOME HEALTH SERVICES | Facility: HOME HEALTHCARE | Age: 83
End: 2023-08-22
Payer: MEDICARE

## 2023-08-22 VITALS
RESPIRATION RATE: 16 BRPM | TEMPERATURE: 99 F | OXYGEN SATURATION: 96 % | HEART RATE: 89 BPM | SYSTOLIC BLOOD PRESSURE: 132 MMHG | DIASTOLIC BLOOD PRESSURE: 70 MMHG

## 2023-08-22 PROCEDURE — G0495 RN CARE TRAIN/EDU IN HH: HCPCS

## 2023-08-22 PROCEDURE — G0151 HHCP-SERV OF PT,EA 15 MIN: HCPCS

## 2023-08-22 ASSESSMENT — ENCOUNTER SYMPTOMS
PAIN: 1
PAIN SEVERITY GOAL: 0/10
LOWEST PAIN SEVERITY IN PAST 24 HOURS: 0/10
BOWEL PATTERN NORMAL: 1
FATIGUE: 1
HIGHEST PAIN SEVERITY IN PAST 24 HOURS: 0/10
NAUSEA: DENIES
STOOL FREQUENCY: LESS THAN DAILY
VOMITING: DENIES
FATIGUES EASILY: 1
LAST BOWEL MOVEMENT: 66708
MUSCLE WEAKNESS: 1

## 2023-08-23 ENCOUNTER — HOME CARE VISIT (OUTPATIENT)
Dept: HOME HEALTH SERVICES | Facility: HOME HEALTHCARE | Age: 83
End: 2023-08-23
Payer: MEDICARE

## 2023-08-23 VITALS
RESPIRATION RATE: 16 BRPM | SYSTOLIC BLOOD PRESSURE: 138 MMHG | DIASTOLIC BLOOD PRESSURE: 82 MMHG | HEART RATE: 84 BPM | TEMPERATURE: 98.6 F | OXYGEN SATURATION: 93 %

## 2023-08-23 PROCEDURE — G0153 HHCP-SVS OF S/L PATH,EA 15MN: HCPCS

## 2023-08-23 ASSESSMENT — ENCOUNTER SYMPTOMS
DENIES PAIN: 1
PERSON REPORTING PAIN: PATIENT
PERSON REPORTING PAIN: PATIENT
DIFFICULTY THINKING: 1
DENIES PAIN: 1
POOR JUDGMENT: 1

## 2023-08-24 ENCOUNTER — HOME CARE VISIT (OUTPATIENT)
Dept: HOME HEALTH SERVICES | Facility: HOME HEALTHCARE | Age: 83
End: 2023-08-24
Payer: MEDICARE

## 2023-08-24 PROCEDURE — G0152 HHCP-SERV OF OT,EA 15 MIN: HCPCS

## 2023-08-24 PROCEDURE — G0299 HHS/HOSPICE OF RN EA 15 MIN: HCPCS

## 2023-08-24 ASSESSMENT — ACTIVITIES OF DAILY LIVING (ADL)
AMBULATION ASSISTANCE: TWO PERSON
CURRENT_FUNCTION: TWO PERSON

## 2023-08-24 ASSESSMENT — ENCOUNTER SYMPTOMS
PERSON REPORTING PAIN: PATIENT
MUSCLE WEAKNESS: 1
FATIGUES EASILY: 1
DRY SKIN: 1
BOWEL PATTERN NORMAL: 1
LIMITED RANGE OF MOTION: 1
LAST BOWEL MOVEMENT: 66710
DENIES PAIN: 1

## 2023-08-24 NOTE — Clinical Note
pt in bed with legs in wheelchair when sn came. she was assisted by 2 caregivers to sit in wheelchair .alert and oriented x3, interactive . denies pain.. caregiver reported that pt had bowel movement with some urine, prior to nurse coming,, not able to get urine sample. pt had been drinking some water and grape juce provided by caregiver, pt has new medication augmentin suspension , caregiver said she needs to pick it up later . wound care done to left lower arm. states she was scratching it before because of itching. reported no itching  during snv . had to wait to see if pt can provide urine, pt said she usually have good urine in the morning. reviewed s/s of UTI and prevention..provided new [place] hat pan and sterile container for urine.. instructions given to caregiver to put urine in plastic bag in fridge and to [facility] for . caregiver verbalized understanding.. pt still not able to urinate before nurse leaves . Pt/Cg response to the services provided: reports no falls or other concerns . Plan for the next visit: will need f/u on urine collection.

## 2023-08-24 NOTE — Clinical Note
Patient discharged from OT on 8/24/23. Caregivers met goals and are agreeable to recommendation of using a talib lift for daily transfers in the future. No further potential progress with patient at this time due to cognitive status. Patient requires max A x2 for all transfers and max A x1-2 for all ADLs.

## 2023-08-25 VITALS
HEART RATE: 90 BPM | TEMPERATURE: 98.2 F | SYSTOLIC BLOOD PRESSURE: 122 MMHG | DIASTOLIC BLOOD PRESSURE: 76 MMHG | OXYGEN SATURATION: 95 % | RESPIRATION RATE: 16 BRPM

## 2023-08-25 ASSESSMENT — PAIN SCALES - PAIN ASSESSMENT IN ADVANCED DEMENTIA (PAINAD)
FACIALEXPRESSION: 0 - SMILING OR INEXPRESSIVE.
NEGVOCALIZATION: 0 - NONE.
BODYLANGUAGE: 0 - RELAXED.
TOTALSCORE: 0
CONSOLABILITY: 0 - NO NEED TO CONSOLE.

## 2023-08-25 ASSESSMENT — ENCOUNTER SYMPTOMS
ARTHRALGIAS: 1
DENIES PAIN: 1
PERSON REPORTING PAIN: PATIENT

## 2023-08-28 ENCOUNTER — HOME CARE VISIT (OUTPATIENT)
Dept: HOME HEALTH SERVICES | Facility: HOME HEALTHCARE | Age: 83
End: 2023-08-28
Payer: MEDICARE

## 2023-08-28 NOTE — Clinical Note
noted  ----- Message -----  From: Nani Guillaume R.N.  Sent: 8/28/2023   4:39 PM PDT  To: Sandy Tubbs R.N.      Giving you a heads up as you are scheduled to see this pt tomorrow      ----- Message -----  From: CATARINO Cotto  Sent: 8/28/2023   4:28 PM PDT  To: Nani Guillaume R.N.    Stopping augmentin and going to try fosfomycin instead single dose  ----- Message -----  From: Nani Guillaume R.N.  Sent: 8/28/2023   4:12 PM PDT  To: Adriane Padilla R.N.; Lisbet Sharp, SLP; *    Pt caregiver called in to office stating pt started new antibiotic for UTI (Augmentin) and since starting the med pt has had liquid diarrhea. CG wants to know if pt should continue the Augmentin or not. Stated this SN would call Yvrose Alvarado [facility] [place] office and let them know as they are the provider following the patient. CG verbalized understanding.  Call then placed to [facility] [place] [place], update given on above and requested pt/cg be updated on what to do abut the Augmentin dosing. Nurse who took call stated she would reach out to Yvrose Alvarado .

## 2023-08-28 NOTE — Clinical Note
Pt caregiver called in to office stating pt started new antibiotic for UTI (Augmentin) and since starting the med pt has had liquid diarrhea. CG wants to know if pt should continue the Augmentin or not. Stated this SN would call Yvrose Alvarado [facility] [place] office and let them know as they are the provider following the patient. CG verbalized understanding.  Call then placed to [facility] [place], update given on above and requested pt/cg be updated on what to do abut the Augmentin dosing. Nurse who took call stated she would reach out to Yvrose Alvarado .

## 2023-08-29 ENCOUNTER — HOME CARE VISIT (OUTPATIENT)
Dept: HOME HEALTH SERVICES | Facility: HOME HEALTHCARE | Age: 83
End: 2023-08-29
Payer: MEDICARE

## 2023-08-29 VITALS
TEMPERATURE: 98.1 F | SYSTOLIC BLOOD PRESSURE: 130 MMHG | DIASTOLIC BLOOD PRESSURE: 70 MMHG | RESPIRATION RATE: 16 BRPM | OXYGEN SATURATION: 96 % | HEART RATE: 84 BPM

## 2023-08-29 PROCEDURE — G0151 HHCP-SERV OF PT,EA 15 MIN: HCPCS

## 2023-08-29 PROCEDURE — G0495 RN CARE TRAIN/EDU IN HH: HCPCS

## 2023-08-29 ASSESSMENT — ENCOUNTER SYMPTOMS
LOWEST PAIN SEVERITY IN PAST 24 HOURS: 0/10
PAIN: 1
LAST BOWEL MOVEMENT: 66715
MUSCLE WEAKNESS: 1
FATIGUE: 1
PERSON REPORTING PAIN: PATIENT
VOMITING: DENIES
FATIGUES EASILY: 1
HIGHEST PAIN SEVERITY IN PAST 24 HOURS: 0/10
BOWEL PATTERN NORMAL: 1
STOOL FREQUENCY: DAILY
PAIN SEVERITY GOAL: 0/10
NAUSEA: DENIES

## 2023-08-29 NOTE — Clinical Note
Patient discharged from home health PT effective 08/29/2023, currently at maximum potential for PT, goals partially met.

## 2023-08-30 ASSESSMENT — ENCOUNTER SYMPTOMS
POOR JUDGMENT: 1
PERSON REPORTING PAIN: PATIENT
DENIES PAIN: 1
DIFFICULTY THINKING: 1

## 2023-09-01 ENCOUNTER — HOME CARE VISIT (OUTPATIENT)
Dept: HOME HEALTH SERVICES | Facility: HOME HEALTHCARE | Age: 83
End: 2023-09-01
Payer: MEDICARE

## 2023-09-01 VITALS
OXYGEN SATURATION: 93 % | SYSTOLIC BLOOD PRESSURE: 122 MMHG | DIASTOLIC BLOOD PRESSURE: 70 MMHG | RESPIRATION RATE: 16 BRPM | HEART RATE: 94 BPM | TEMPERATURE: 97.5 F

## 2023-09-01 PROBLEM — M54.50 LOW BACK PAIN: Status: ACTIVE | Noted: 2022-08-05

## 2023-09-01 PROBLEM — M81.8 DISUSE OSTEOPOROSIS: Status: ACTIVE | Noted: 2023-02-13

## 2023-09-01 PROBLEM — M54.30 SCIATICA: Status: ACTIVE | Noted: 2023-02-13

## 2023-09-01 PROBLEM — N39.46 MIXED STRESS AND URGE URINARY INCONTINENCE: Status: ACTIVE | Noted: 2023-02-13

## 2023-09-01 PROBLEM — Z87.39 HISTORY OF RHEUMATOID ARTHRITIS: Status: ACTIVE | Noted: 2023-02-13

## 2023-09-01 PROBLEM — C50.919 MALIGNANT NEOPLASM OF BREAST (HCC): Status: ACTIVE | Noted: 2023-02-13

## 2023-09-01 PROBLEM — M47.816 ARTHROPATHY OF LUMBAR FACET JOINT: Status: ACTIVE | Noted: 2023-02-13

## 2023-09-01 PROCEDURE — G0299 HHS/HOSPICE OF RN EA 15 MIN: HCPCS

## 2023-09-01 ASSESSMENT — ENCOUNTER SYMPTOMS
PERSON REPORTING PAIN: PATIENT
NAUSEA: DENIES
BOWEL PATTERN NORMAL: 1
VOMITING: DENIES
DENIES PAIN: 1
MUSCLE WEAKNESS: 1

## 2023-09-01 NOTE — CASE COMMUNICATION
"Pt's caregiver greeted SN outside and reported that pt was being challenging with the caregiver. When SN walked into the room, pt says that she can leave (referring to the caregiver). Pt's caregiver reported that she was just trying to change her brief. SN asked pt's caregiver if she was able to get the antibiotic and pt's caregiver says that she called this morning and they wouldn't get approval for 5-7 days. Pt's caregiver reports ariana t pt's urine was brown and had a foul odor. Pt's caregiver says that she has been trying to get a urine sample, but she is unable to. Pt completed her Celebrex prescription today and encouraged pt's caregiver to report this to [facility] [place] [facility] (coming right after SN). . Educated pt and pt's caregiver about the importance of drinking plenty of water, wiping front and back, and allowing the caregiver to change her brief often. Educated pt and pt's car egiver signs and symptoms UTI (burning, odor, confusion, fever, chills etc). Pt's caregiver verbalized understanding.   Pt says \"I love water and I drink a lot\". Pt's caregiver pulled SN aside and told this SN that pt refuses to drink water and pt loves to drink Diet Pepsi. Pt's caregiver says that in the evening pt gets \"meaner\" right before her evening dose of  Remeron. Pt's caregiver says that without the Remeron, pt gets aggressive  and will try to hit and throw her feces at the caregiver      Maybe we could get an order to straight catheter to collect her urine?"

## 2023-09-06 NOTE — CASE COMMUNICATION
noted  ----- Message -----  From: Rosendo Finley, PT  Sent: 8/30/2023   9:08 AM PDT  To: Adriane Padilla R.N.; Donal Durán      Patient discharged from home health PT effective 08/29/2023, currently at maximum potential for PT, goals partially met.

## 2023-09-06 NOTE — CASE COMMUNICATION
"noted  ----- Message -----  From: Kaity Guy P.A.-C.  Sent: 9/6/2023   5:58 AM PDT  To: Adriane Padilla R.N.; Nani Guillaume R.N.; *  Subject: Request                                            DME order  for atlib lift and hospital bed requested.  Lab with UA/straight cath should have already been sent.  Thank you    ----- Message -----  From: Radha Marina R.N.  Sent: 9/1/2023   4:01 PM PDT  To: Adriane Padilla R.N.; Nani kennedy RDanielN.; *    Pt's caregiver greeted SN outside and reported that pt was being challenging with the caregiver. When SN walked into the room, pt says that she can leave (referring to the caregiver). Pt's caregiver reported that she was just trying to change her brief. SN asked pt's caregiver if she was able to get the antibiotic and pt's caregiver says that she called this morning and they wouldn't get approval for 5-7 days. Pt's caregi jose reports that pt's urine was brown and had a foul odor. Pt's caregiver says that she has been trying to get a urine sample, but she is unable to. Pt completed her Celebrex prescription today and encouraged pt's caregiver to report this to [facility] [place] (coming right after SN). . Educated pt and pt's caregiver about the importance of drinking plenty of water, wiping front and back, and allowing the caregiver to change her brief often. Educated  pt and pt's caregiver signs and symptoms UTI (burning, odor, confusion, fever, chills etc). Pt's caregiver verbalized understanding.   Pt says \"I love water and I drink a lot\". Pt's caregiver pulled SN aside and told this SN that pt refuses to drink water and pt loves to drink Diet Pepsi. Pt's caregiver says that in the evening pt gets \"meaner\" right before her evening dose of  Remeron. Pt's caregiver says that without the Remeron, pt g ets aggressive and will try to hit and throw her feces at the caregiver      Maybe we could get an order to straight catheter to collect her urine?  "

## 2023-09-06 NOTE — CASE COMMUNICATION
noted  ----- Message -----  From: Nani Guillaume R.N.  Sent: 8/28/2023   4:12 PM PDT  To: Adriane Padilla R.N.; Lisbet Sharp, SLP; *      Pt caregiver called in to office stating pt started new antibiotic for UTI (Augmentin) and since starting the med pt has had liquid diarrhea. CG wants to know if pt should continue the Augmentin or not. Stated this SN would call Yvrose Alvarado [facility] office and let them know as they are the provider foll owing the patient. CG verbalized understanding.  Call then placed to [facility], update given on above and requested pt/cg be updated on what to do abut the Augmentin dosing. Nurse who took call stated she would reach out to Yvrose Alvarado .

## 2023-09-07 ENCOUNTER — HOME CARE VISIT (OUTPATIENT)
Dept: HOME HEALTH SERVICES | Facility: HOME HEALTHCARE | Age: 83
End: 2023-09-07
Payer: MEDICARE

## 2023-09-07 ENCOUNTER — HOSPITAL ENCOUNTER (OUTPATIENT)
Facility: MEDICAL CENTER | Age: 83
End: 2023-09-07
Attending: PHYSICIAN ASSISTANT
Payer: MEDICARE

## 2023-09-07 DIAGNOSIS — Z87.39 HISTORY OF RHEUMATOID ARTHRITIS: ICD-10-CM

## 2023-09-07 DIAGNOSIS — R73.03 PREDIABETES: ICD-10-CM

## 2023-09-07 DIAGNOSIS — E78.1 PURE HYPERTRIGLYCERIDEMIA: ICD-10-CM

## 2023-09-07 DIAGNOSIS — N18.32 STAGE 3B CHRONIC KIDNEY DISEASE: ICD-10-CM

## 2023-09-07 DIAGNOSIS — N39.0 RECURRENT UTI: ICD-10-CM

## 2023-09-07 DIAGNOSIS — I10 PRIMARY HYPERTENSION: ICD-10-CM

## 2023-09-07 LAB
25(OH)D3 SERPL-MCNC: 6 NG/ML (ref 30–100)
ALBUMIN SERPL BCP-MCNC: 3.7 G/DL (ref 3.2–4.9)
ALBUMIN/GLOB SERPL: 1.6 G/DL
ALP SERPL-CCNC: 71 U/L (ref 30–99)
ALT SERPL-CCNC: 13 U/L (ref 2–50)
ANION GAP SERPL CALC-SCNC: 10 MMOL/L (ref 7–16)
APPEARANCE UR: CLEAR
AST SERPL-CCNC: 19 U/L (ref 12–45)
BACTERIA #/AREA URNS HPF: NEGATIVE /HPF
BASOPHILS # BLD AUTO: 0.4 % (ref 0–1.8)
BASOPHILS # BLD: 0.02 K/UL (ref 0–0.12)
BILIRUB SERPL-MCNC: 0.3 MG/DL (ref 0.1–1.5)
BILIRUB UR QL STRIP.AUTO: NEGATIVE
BUN SERPL-MCNC: 15 MG/DL (ref 8–22)
CALCIUM ALBUM COR SERPL-MCNC: 8.9 MG/DL (ref 8.5–10.5)
CALCIUM SERPL-MCNC: 8.7 MG/DL (ref 8.5–10.5)
CHLORIDE SERPL-SCNC: 107 MMOL/L (ref 96–112)
CHOLEST SERPL-MCNC: 90 MG/DL (ref 100–199)
CO2 SERPL-SCNC: 26 MMOL/L (ref 20–33)
COLOR UR: YELLOW
CREAT SERPL-MCNC: 0.95 MG/DL (ref 0.5–1.4)
CRP SERPL HS-MCNC: <0.3 MG/DL (ref 0–0.75)
EOSINOPHIL # BLD AUTO: 0.15 K/UL (ref 0–0.51)
EOSINOPHIL NFR BLD: 2.7 % (ref 0–6.9)
EPI CELLS #/AREA URNS HPF: NEGATIVE /HPF
ERYTHROCYTE [DISTWIDTH] IN BLOOD BY AUTOMATED COUNT: 46.6 FL (ref 35.9–50)
ERYTHROCYTE [SEDIMENTATION RATE] IN BLOOD BY WESTERGREN METHOD: 14 MM/HOUR (ref 0–25)
FASTING STATUS PATIENT QL REPORTED: NORMAL
FOLATE SERPL-MCNC: 9 NG/ML
GFR SERPLBLD CREATININE-BSD FMLA CKD-EPI: 59 ML/MIN/1.73 M 2
GLOBULIN SER CALC-MCNC: 2.3 G/DL (ref 1.9–3.5)
GLUCOSE SERPL-MCNC: 103 MG/DL (ref 65–99)
GLUCOSE UR STRIP.AUTO-MCNC: NEGATIVE MG/DL
HCT VFR BLD AUTO: 38.9 % (ref 37–47)
HDLC SERPL-MCNC: 43 MG/DL
HGB BLD-MCNC: 12.2 G/DL (ref 12–16)
HYALINE CASTS #/AREA URNS LPF: ABNORMAL /LPF
IMM GRANULOCYTES # BLD AUTO: 0.01 K/UL (ref 0–0.11)
IMM GRANULOCYTES NFR BLD AUTO: 0.2 % (ref 0–0.9)
KETONES UR STRIP.AUTO-MCNC: NEGATIVE MG/DL
LDLC SERPL CALC-MCNC: 23 MG/DL
LEUKOCYTE ESTERASE UR QL STRIP.AUTO: ABNORMAL
LYMPHOCYTES # BLD AUTO: 1.06 K/UL (ref 1–4.8)
LYMPHOCYTES NFR BLD: 19.2 % (ref 22–41)
MCH RBC QN AUTO: 27.9 PG (ref 27–33)
MCHC RBC AUTO-ENTMCNC: 31.4 G/DL (ref 32.2–35.5)
MCV RBC AUTO: 89 FL (ref 81.4–97.8)
MICRO URNS: ABNORMAL
MONOCYTES # BLD AUTO: 0.41 K/UL (ref 0–0.85)
MONOCYTES NFR BLD AUTO: 7.4 % (ref 0–13.4)
NEUTROPHILS # BLD AUTO: 3.87 K/UL (ref 1.82–7.42)
NEUTROPHILS NFR BLD: 70.1 % (ref 44–72)
NITRITE UR QL STRIP.AUTO: NEGATIVE
NRBC # BLD AUTO: 0 K/UL
NRBC BLD-RTO: 0 /100 WBC (ref 0–0.2)
PH UR STRIP.AUTO: 7 [PH] (ref 5–8)
PLATELET # BLD AUTO: 244 K/UL (ref 164–446)
PMV BLD AUTO: 11 FL (ref 9–12.9)
POTASSIUM SERPL-SCNC: 3.1 MMOL/L (ref 3.6–5.5)
PROT SERPL-MCNC: 6 G/DL (ref 6–8.2)
PROT UR QL STRIP: 30 MG/DL
RBC # BLD AUTO: 4.37 M/UL (ref 4.2–5.4)
RBC # URNS HPF: ABNORMAL /HPF
RBC UR QL AUTO: NEGATIVE
SODIUM SERPL-SCNC: 143 MMOL/L (ref 135–145)
SP GR UR STRIP.AUTO: 1.02
TRIGL SERPL-MCNC: 120 MG/DL (ref 0–149)
TSH SERPL DL<=0.005 MIU/L-ACNC: 1.04 UIU/ML (ref 0.38–5.33)
UROBILINOGEN UR STRIP.AUTO-MCNC: 0.2 MG/DL
VIT B12 SERPL-MCNC: 399 PG/ML (ref 211–911)
WBC # BLD AUTO: 5.5 K/UL (ref 4.8–10.8)
WBC #/AREA URNS HPF: ABNORMAL /HPF

## 2023-09-07 PROCEDURE — 86038 ANTINUCLEAR ANTIBODIES: CPT

## 2023-09-07 PROCEDURE — 85025 COMPLETE CBC W/AUTO DIFF WBC: CPT

## 2023-09-07 PROCEDURE — 80061 LIPID PANEL: CPT

## 2023-09-07 PROCEDURE — 82607 VITAMIN B-12: CPT

## 2023-09-07 PROCEDURE — 665001 SOC-HOME HEALTH

## 2023-09-07 PROCEDURE — 86140 C-REACTIVE PROTEIN: CPT

## 2023-09-07 PROCEDURE — 83036 HEMOGLOBIN GLYCOSYLATED A1C: CPT

## 2023-09-07 PROCEDURE — 85652 RBC SED RATE AUTOMATED: CPT

## 2023-09-07 PROCEDURE — G0299 HHS/HOSPICE OF RN EA 15 MIN: HCPCS

## 2023-09-07 PROCEDURE — 82746 ASSAY OF FOLIC ACID SERUM: CPT

## 2023-09-07 PROCEDURE — 82306 VITAMIN D 25 HYDROXY: CPT

## 2023-09-07 PROCEDURE — 81001 URINALYSIS AUTO W/SCOPE: CPT

## 2023-09-07 PROCEDURE — 80053 COMPREHEN METABOLIC PANEL: CPT

## 2023-09-07 PROCEDURE — 84443 ASSAY THYROID STIM HORMONE: CPT

## 2023-09-08 LAB
EST. AVERAGE GLUCOSE BLD GHB EST-MCNC: 117 MG/DL
HBA1C MFR BLD: 5.7 % (ref 4–5.6)

## 2023-09-08 NOTE — CASE COMMUNICATION
noted  ----- Message -----  From: Ashley Schaffer R.N.  Sent: 8/25/2023   8:12 PM PDT  To: Adriane Padilla R.N.; Nani Guillaume R.N.; *      pt in bed with legs in wheelchair when sn came. she was assisted by 2 caregivers to sit in wheelchair .alert and oriented x3, interactive . denies pain.. caregiver reported that pt had bowel movement with some urine, prior to nurse coming,, not able to get urine sample. pt had been drinking some wa ter and grape juce provided by caregiver, pt has new medication augmentin suspension , caregiver said she needs to pick it up later . wound care done to left lower arm. states she was scratching it before because of itching. reported no itching  during snv . had to wait to see if pt can provide urine, pt said she usually have good urine in the morning. reviewed s/s of UTI and prevention..provided new [place] hat pan and sterile container  for urine.. instructions given to caregiver to put urine in plastic bag in fridge and to [facility] for . caregiver verbalized understanding.. pt still not able to urinate before nurse leaves . Pt/Cg response to the services provided: reports no falls or other concerns . Plan for the next visit: will need f/u on urine collection.

## 2023-09-08 NOTE — CASE COMMUNICATION
"noted  ----- Message -----  From: Pablo Triana Ass't  Sent: 9/6/2023   3:22 PM PDT  To: Adriane Padilla R.N.; Nani Guillaume R.N.; *  Subject: RE: Request                                        Sudheer received a message from [facility] PT:    Message received from [facility]  PT regarding the request for a Lulu lift and a hospital bed. \"This recommendation aims to reduce the caregiver burden during patient transfers and enhance overal l patient safety\".   Patient requires two person transfers.    Visit note reviewed. I tried calling pt's CG's and pt's son to discuss DME orders and n/a. Orders for semi-electric hospital and lulu sent to Sudheer for sig and will be faxed to [facility] w/ pt demo and visit note.     ----- Message -----  From: Kaity Guy P.A.-C.  Sent: 9/6/2023   5:58 AM PDT  To: Adriane Padilla R.N.; Nani Guillaume R.N.; *  Subject: Request                                           DME order  for lulu lift and hospital bed requested.  Lab with UA/straight cath should have already been sent.  Thank you    ----- Message -----  From: Radha Marina R.N.  Sent: 9/1/2023   4:01 PM PDT  To: Adriane Padilla R.N.; Nani Guillaume R.N.; *    Pt's caregiver greeted SN outside and reported that pt was being challenging with the caregiver. When SN walked into the room, pt says that she can l eave (referring to the caregiver). Pt's caregiver reported that she was just trying to change her brief. SN asked pt's caregiver if she was able to get the antibiotic and pt's caregiver says that she called this morning and they wouldn't get approval for 5-7 days. Pt's caregiver reports that pt's urine was brown and had a foul odor. Pt's caregiver says that she has been trying to get a urine sample, but she is unable to. Pt completed he r Celebrex prescription today and encouraged pt's caregiver to report this to [facility] (coming right after SN). . Educated pt and pt's caregiver about the importance of drinking plenty of " "water, wiping front and back, and allowing the caregiver to change her brief often. Educated pt and pt's caregiver signs and symptoms UTI (burning, odor, confusion, fever, chills etc). Pt's caregiver verbalized understanding.   Pt says \"I love water and I d rink a lot\". Pt's caregiver pulled SN aside and told this SN that pt refuses to drink water and pt loves to drink Diet Pepsi. Pt's caregiver says that in the evening pt gets \"meaner\" right before her evening dose of  Remeron. Pt's caregiver says that without the Remeron, pt gets aggressive and will try to hit and throw her feces at the caregiver      Maybe we could get an order to straight catheter to collect her urine?    "

## 2023-09-09 VITALS
RESPIRATION RATE: 16 BRPM | HEART RATE: 71 BPM | TEMPERATURE: 98.3 F | SYSTOLIC BLOOD PRESSURE: 150 MMHG | OXYGEN SATURATION: 93 % | DIASTOLIC BLOOD PRESSURE: 90 MMHG

## 2023-09-09 ASSESSMENT — ENCOUNTER SYMPTOMS
MUSCLE WEAKNESS: 1
BOWEL PATTERN NORMAL: 1
PERSON REPORTING PAIN: PATIENT
STOOL FREQUENCY: LESS THAN DAILY
POOR JUDGMENT: 1
LAST BOWEL MOVEMENT: 66723
DENIES PAIN: 1

## 2023-09-09 ASSESSMENT — ACTIVITIES OF DAILY LIVING (ADL)
CURRENT_FUNCTION: ONE PERSON
AMBULATION ASSISTANCE: NON-AMBULATORY

## 2023-09-10 LAB — NUCLEAR IGG SER QL IA: NORMAL

## 2023-09-10 NOTE — CASE COMMUNICATION
noted  ----- Message -----  From: Trish Campos OT  Sent: 8/24/2023  11:16 AM PDT  To: Adriane Padilla R.N.; Lisbet Sharp, SLP; *      Patient discharged from OT on 8/24/23. Caregivers met goals and are agreeable to recommendation of using a talib lift for daily transfers in the future. No further potential progress with patient at this time due to cognitive status. Patient requires max A x2 for all transfers and max A x1-2 for all ADL s.

## 2023-09-10 NOTE — CASE COMMUNICATION
"noted  ----- Message -----  From: Radha Marina R.N.  Sent: 9/1/2023   4:01 PM PDT  To: Adriane Padilla R.N.; Nani Guillaume R.N.; *      Pt's caregiver greeted SN outside and reported that pt was being challenging with the caregiver. When SN walked into the room, pt says that she can leave (referring to the caregiver). Pt's caregiver reported that she was just trying to change her brief. SN asked pt's caregiver if she was able to g et the antibiotic and pt's caregiver says that she called this morning and they wouldn't get approval for 5-7 days. Pt's caregiver reports that pt's urine was brown and had a foul odor. Pt's caregiver says that she has been trying to get a urine sample, but she is unable to. Pt completed her Celebrex prescription today and encouraged pt's caregiver to report this to [facility] (coming right after SN). . Educated pt and pt's caregiver about the  importance of drinking plenty of water, wiping front and back, and allowing the caregiver to change her brief often. Educated pt and pt's caregiver signs and symptoms UTI (burning, odor, confusion, fever, chills etc). Pt's caregiver verbalized understanding.   Pt says \"I love water and I drink a lot\". Pt's caregiver pulled SN aside and told this SN that pt refuses to drink water and pt loves to drink Diet Pepsi. Pt's caregiver says ariana t in the evening pt gets \"meaner\" right before her evening dose of  Remeron. Pt's caregiver says that without the Remeron, pt gets aggressive and will try to hit and throw her feces at the caregiver      Maybe we could get an order to straight catheter to collect her urine?"

## 2023-09-12 NOTE — CASE COMMUNICATION
noted  ----- Message -----  From: Lisbet Sharp, SLP  Sent: 8/26/2023   4:56 PM PDT  To: Adriane Padilla R.N.; Trish Campos, OT; *       d/c 8/23/23.

## 2023-09-12 NOTE — CASE COMMUNICATION
noted  ----- Message -----  From: Coby Santos R.N.  Sent: 8/19/2023   5:37 PM PDT  To: Adriane Padilla R.N.; Nani Guillaume R.N.; *      Received a call through answering service from patient's caregiver Aarti, states patient is on an antibiotic to treat infection, reports patient has been experiencing itching on both arms since starting antibiotic and is scratching so much it is causing bruising, Aarti asked if she can stop antibi otic. SN instructed Aarti to call on call provider with [facility] to inquire about stopping or changing antibiotic, provided phone number, Aarti states next dose due in AM and will call prior to administering next dose.

## 2023-09-14 ENCOUNTER — HOME CARE VISIT (OUTPATIENT)
Dept: HOME HEALTH SERVICES | Facility: HOME HEALTHCARE | Age: 83
End: 2023-09-14
Payer: MEDICARE

## 2023-09-14 VITALS
SYSTOLIC BLOOD PRESSURE: 124 MMHG | TEMPERATURE: 97.4 F | HEART RATE: 81 BPM | RESPIRATION RATE: 18 BRPM | OXYGEN SATURATION: 93 % | DIASTOLIC BLOOD PRESSURE: 62 MMHG

## 2023-09-14 PROCEDURE — G0493 RN CARE EA 15 MIN HH/HOSPICE: HCPCS

## 2023-09-14 ASSESSMENT — ENCOUNTER SYMPTOMS
POOR JUDGMENT: 1
DIFFICULTY THINKING: 1
DENIES PAIN: 1

## 2023-09-14 NOTE — CASE COMMUNICATION
I don't see new prescriptions for  KDUR or Vit D 50.000 units in home. Caregiver has not heard anything from [facility]. Do you know what day the prescriptions were sent

## 2023-09-15 ASSESSMENT — ENCOUNTER SYMPTOMS
STOOL FREQUENCY: LESS THAN DAILY
BOWEL PATTERN NORMAL: 1
ARTHRALGIAS: 1
LAST BOWEL MOVEMENT: 66731
MUSCLE WEAKNESS: 1

## 2023-09-15 ASSESSMENT — ACTIVITIES OF DAILY LIVING (ADL)
AMBULATION ASSISTANCE: ONE PERSON
CURRENT_FUNCTION: ONE PERSON

## 2023-09-15 NOTE — CASE COMMUNICATION
Elsa caregiver requested prescriptions be sent to [facility]  ----- Message -----  From: Kaity Guy P.A.-C.  Sent: 9/14/2023  12:43 PM PDT  To: Adriane Padilla R.N.  Subject: Rx question                                        [facility] on 9/11/23.  Please let us know if we need to send to a different pharmacy  ----- Message -----  From: Adriane Padilla R.N.  Sent: 9/14/202 3  12:27 PM PDT  To: Kaity Guy P.A.-C.    I don't see new prescriptions for  KDUR or Vit D 50.000 units in home. Caregiver has not heard anything from [facility]. Do you know what day the prescriptions were sent

## 2023-09-15 NOTE — CASE COMMUNICATION
I will let CG know. Thanks  ----- Message -----  From: Kaity Guy P.A.-C.  Sent: 9/14/2023  12:43 PM PDT  To: Adriane Padilla R.N.  Subject: Rx question                                        [facility] on 9/11/23.  Please let us know if we need to send to a different pharmacy  ----- Message -----  From: Adriane Padilla R.N.  Sent: 9/14/2023  12:27 PM PDT  To: Kaity Guy P.A.-C.     I don't see new prescriptions for  KDUR or Vit D 50.000 units in home. Caregiver has not heard anything from [facility]. Do you know what day the prescriptions were sent

## 2023-09-21 ENCOUNTER — HOME CARE VISIT (OUTPATIENT)
Dept: HOME HEALTH SERVICES | Facility: HOME HEALTHCARE | Age: 83
End: 2023-09-21
Payer: MEDICARE

## 2023-09-21 VITALS
DIASTOLIC BLOOD PRESSURE: 66 MMHG | OXYGEN SATURATION: 91 % | SYSTOLIC BLOOD PRESSURE: 130 MMHG | HEART RATE: 81 BPM | TEMPERATURE: 98.8 F | RESPIRATION RATE: 18 BRPM

## 2023-09-21 PROCEDURE — G0493 RN CARE EA 15 MIN HH/HOSPICE: HCPCS

## 2023-09-21 ASSESSMENT — ENCOUNTER SYMPTOMS
PAIN LOCATION: LT WRIST
LAST BOWEL MOVEMENT: 66738
BOWEL PATTERN NORMAL: 1
STOOL FREQUENCY: LESS THAN DAILY
PAIN: 1
PERSON REPORTING PAIN: PATIENT

## 2023-09-21 ASSESSMENT — PAIN SCALES - PAIN ASSESSMENT IN ADVANCED DEMENTIA (PAINAD)
BODYLANGUAGE: 0 - RELAXED.
CONSOLABILITY: 1 - DISTRACTED OR REASSURED BY VOICE OR TOUCH.
TOTALSCORE: 4
NEGVOCALIZATION: 1 - OCCASIONAL MOAN OR GROAN. LOW-LEVEL SPEECH WITH A NEGATIVE OR DISAPPROVING QUALITY.
FACIALEXPRESSION: 2 - FACIAL GRIMACING.

## 2023-09-21 NOTE — CASE COMMUNICATION
FYI  Caregiver Elsa reports pt has been very agitated & throughing things around. She somehow has injured lt wrist & does not want to move it. No swelling although she really does not want me to touch it. Now has Vit D & potassium.  Agency discharge done as no further skilled needs.

## 2023-09-22 ENCOUNTER — HOME CARE VISIT (OUTPATIENT)
Dept: HOME HEALTH SERVICES | Facility: HOME HEALTHCARE | Age: 83
End: 2023-09-22
Payer: MEDICARE

## 2023-09-22 SDOH — ECONOMIC STABILITY: FOOD INSECURITY: MEALS PER DAY: 3

## 2023-09-22 ASSESSMENT — ACTIVITIES OF DAILY LIVING (ADL)
OASIS_M1830: 03
HOME_HEALTH_OASIS: 01

## 2023-09-22 ASSESSMENT — ENCOUNTER SYMPTOMS
CHANGE IN APPETITE: UNCHANGED
CONTUSION: 1
APPETITE LEVEL: FAIR
POOR JUDGMENT: 1
AGITATION: 1
DIFFICULTY THINKING: 1

## 2023-09-23 NOTE — CASE COMMUNICATION
noted  ----- Message -----  From: Kaity Guy P.A.-C.  Sent: 9/22/2023   8:00 AM PDT  To: Adriane Padilla R.N.; Shahana Barron R.N.; *  Subject: Left wrist pain                                    Thank you, Adriane Harkins/rosalind Lucero order mobile x-ray of left wrist.  Dx:  wrist pain    Thank you  ----- Message -----  From: Adriane Padilla R.N.  Sent: 9/21/2023   2:45 PM PDT  To: Kaity Guy P.A.-C.    FYI  Caregiver Elsa reports pt has been  very agitated & throughing things around. She somehow has injured lt wrist & does not want to move it. No swelling although she really does not want me to touch it. Now has Vit D & potassium.  Agency discharge done as no further skilled needs.

## 2023-09-27 NOTE — CASE COMMUNICATION
Thank you  ----- Message -----  From: Shahana Barron R.N.  Sent: 9/26/2023   7:43 AM PDT  To: Adriane Padilla R.N.; Kaity Guy P.A.-C.; *  Subject: RE: Left wrist pain                                Wrist X-Ray reveals degenerative changes of the wrist without acute fracture.  Kaity indicates that if wrist pain is persistent, to consider further evaluation by the [facility] and to utilize Tylenol for pain.  ----- Message -----  From: RICARDA Carpenter  Sent: 9/22/2023   8:00 AM PDT  To: Adriane Padilla R.N.; Shahana Barron R.N.; *  Subject: Left wrist pain                                  Thank you, Adriane Harkins/rosalind Luecro order mobile x-ray of left wrist.  Dx:  wrist pain    Thank you  ----- Message -----  From: Adriane Padilla R.N.  Sent: 9/21/2023   2:45 PM PDT  To: Kaity Guy P.A.-C.    FYI  Caregiver Elsa reports pt has been very agitated & throughing things around. She some how has injured lt wrist & does not want to move it. No swelling although she really does not want me to touch it. Now has Vit D & potassium.  Agency discharge done as no further skilled needs.

## 2023-09-28 ENCOUNTER — HOME CARE VISIT (OUTPATIENT)
Dept: HOME HEALTH SERVICES | Facility: HOME HEALTHCARE | Age: 83
End: 2023-09-28
Payer: MEDICARE

## 2023-09-28 NOTE — CASE COMMUNICATION
Quality Review for [place] [facility] by IZABELLA Harvey, BUBBA on  September 28, 2023       Edits completed by IZABELLA Harvey RN:  1.  c is NA per the plan of care

## 2023-09-29 NOTE — CASE COMMUNICATION
I agree with changes  ----- Message -----  From: Gabby Harvey R.N.  Sent: 9/28/2023  12:43 PM PDT  To: Adriane Padilla R.N.      Quality Review for [place] [facility] by IZABELLA Harvey RN on  September 28, 2023       Edits completed by IZABELLA Harvey RN:  1.  c is NA per the plan of care

## 2023-10-27 ENCOUNTER — TELEPHONE (OUTPATIENT)
Dept: HEALTH INFORMATION MANAGEMENT | Facility: OTHER | Age: 83
End: 2023-10-27
Payer: MEDICARE

## 2023-11-15 ENCOUNTER — HOSPITAL ENCOUNTER (INPATIENT)
Facility: MEDICAL CENTER | Age: 83
LOS: 2 days | DRG: 689 | End: 2023-11-17
Attending: EMERGENCY MEDICINE | Admitting: HOSPITALIST
Payer: MEDICARE

## 2023-11-15 ENCOUNTER — APPOINTMENT (OUTPATIENT)
Dept: RADIOLOGY | Facility: MEDICAL CENTER | Age: 83
DRG: 689 | End: 2023-11-15
Attending: EMERGENCY MEDICINE
Payer: MEDICARE

## 2023-11-15 ENCOUNTER — APPOINTMENT (OUTPATIENT)
Dept: CARDIOLOGY | Facility: MEDICAL CENTER | Age: 83
DRG: 689 | End: 2023-11-15
Attending: HOSPITALIST
Payer: MEDICARE

## 2023-11-15 DIAGNOSIS — N39.0 URINARY TRACT INFECTION IN ELDERLY PATIENT: ICD-10-CM

## 2023-11-15 DIAGNOSIS — I16.0 HYPERTENSIVE URGENCY: ICD-10-CM

## 2023-11-15 DIAGNOSIS — R53.81 DEBILITY: ICD-10-CM

## 2023-11-15 DIAGNOSIS — R09.02 HYPOXIA: ICD-10-CM

## 2023-11-15 DIAGNOSIS — R53.81 PHYSICAL DEBILITY: ICD-10-CM

## 2023-11-15 PROBLEM — J96.01 ACUTE RESPIRATORY FAILURE WITH HYPOXIA (HCC): Status: ACTIVE | Noted: 2023-11-15

## 2023-11-15 PROBLEM — J81.0 ACUTE PULMONARY EDEMA (HCC): Status: ACTIVE | Noted: 2023-11-15

## 2023-11-15 PROBLEM — N30.00 ACUTE CYSTITIS WITHOUT HEMATURIA: Status: ACTIVE | Noted: 2023-11-15

## 2023-11-15 LAB
ALBUMIN SERPL BCP-MCNC: 4 G/DL (ref 3.2–4.9)
ALBUMIN/GLOB SERPL: 1.4 G/DL
ALP SERPL-CCNC: 70 U/L (ref 30–99)
ALT SERPL-CCNC: 12 U/L (ref 2–50)
ANION GAP SERPL CALC-SCNC: 13 MMOL/L (ref 7–16)
APPEARANCE UR: ABNORMAL
AST SERPL-CCNC: 21 U/L (ref 12–45)
BACTERIA #/AREA URNS HPF: ABNORMAL /HPF
BASOPHILS # BLD AUTO: 0.1 % (ref 0–1.8)
BASOPHILS # BLD: 0.01 K/UL (ref 0–0.12)
BILIRUB SERPL-MCNC: 0.7 MG/DL (ref 0.1–1.5)
BILIRUB UR QL STRIP.AUTO: NEGATIVE
BUN SERPL-MCNC: 16 MG/DL (ref 8–22)
CALCIUM ALBUM COR SERPL-MCNC: 9.5 MG/DL (ref 8.5–10.5)
CALCIUM SERPL-MCNC: 9.5 MG/DL (ref 8.5–10.5)
CHLORIDE SERPL-SCNC: 106 MMOL/L (ref 96–112)
CO2 SERPL-SCNC: 22 MMOL/L (ref 20–33)
COLOR UR: ABNORMAL
CREAT SERPL-MCNC: 1.16 MG/DL (ref 0.5–1.4)
EOSINOPHIL # BLD AUTO: 0.05 K/UL (ref 0–0.51)
EOSINOPHIL NFR BLD: 0.7 % (ref 0–6.9)
EPI CELLS #/AREA URNS HPF: ABNORMAL /HPF
ERYTHROCYTE [DISTWIDTH] IN BLOOD BY AUTOMATED COUNT: 44.7 FL (ref 35.9–50)
FLUAV RNA SPEC QL NAA+PROBE: NEGATIVE
FLUBV RNA SPEC QL NAA+PROBE: NEGATIVE
GFR SERPLBLD CREATININE-BSD FMLA CKD-EPI: 46 ML/MIN/1.73 M 2
GLOBULIN SER CALC-MCNC: 2.9 G/DL (ref 1.9–3.5)
GLUCOSE SERPL-MCNC: 107 MG/DL (ref 65–99)
GLUCOSE UR STRIP.AUTO-MCNC: NEGATIVE MG/DL
HCT VFR BLD AUTO: 42.7 % (ref 37–47)
HGB BLD-MCNC: 14.2 G/DL (ref 12–16)
IMM GRANULOCYTES # BLD AUTO: 0.02 K/UL (ref 0–0.11)
IMM GRANULOCYTES NFR BLD AUTO: 0.3 % (ref 0–0.9)
KETONES UR STRIP.AUTO-MCNC: 15 MG/DL
LACTATE SERPL-SCNC: 1.1 MMOL/L (ref 0.5–2)
LEUKOCYTE ESTERASE UR QL STRIP.AUTO: ABNORMAL
LYMPHOCYTES # BLD AUTO: 0.88 K/UL (ref 1–4.8)
LYMPHOCYTES NFR BLD: 12.5 % (ref 22–41)
MCH RBC QN AUTO: 28.4 PG (ref 27–33)
MCHC RBC AUTO-ENTMCNC: 33.3 G/DL (ref 32.2–35.5)
MCV RBC AUTO: 85.4 FL (ref 81.4–97.8)
MICRO URNS: ABNORMAL
MONOCYTES # BLD AUTO: 0.54 K/UL (ref 0–0.85)
MONOCYTES NFR BLD AUTO: 7.7 % (ref 0–13.4)
NEUTROPHILS # BLD AUTO: 5.52 K/UL (ref 1.82–7.42)
NEUTROPHILS NFR BLD: 78.7 % (ref 44–72)
NITRITE UR QL STRIP.AUTO: NEGATIVE
NRBC # BLD AUTO: 0 K/UL
NRBC BLD-RTO: 0 /100 WBC (ref 0–0.2)
NT-PROBNP SERPL IA-MCNC: 9043 PG/ML (ref 0–125)
PH UR STRIP.AUTO: 5.5 [PH] (ref 5–8)
PLATELET # BLD AUTO: 253 K/UL (ref 164–446)
PMV BLD AUTO: 10.2 FL (ref 9–12.9)
POTASSIUM SERPL-SCNC: 3.8 MMOL/L (ref 3.6–5.5)
PROCALCITONIN SERPL-MCNC: 0.17 NG/ML
PROT SERPL-MCNC: 6.9 G/DL (ref 6–8.2)
PROT UR QL STRIP: 100 MG/DL
RBC # BLD AUTO: 5 M/UL (ref 4.2–5.4)
RBC # URNS HPF: ABNORMAL /HPF
RBC UR QL AUTO: NEGATIVE
RSV RNA SPEC QL NAA+PROBE: NEGATIVE
SARS-COV-2 RNA RESP QL NAA+PROBE: NOTDETECTED
SODIUM SERPL-SCNC: 141 MMOL/L (ref 135–145)
SP GR UR STRIP.AUTO: 1.02
SPECIMEN SOURCE: NORMAL
TROPONIN T SERPL-MCNC: 27 NG/L (ref 6–19)
UROBILINOGEN UR STRIP.AUTO-MCNC: 1 MG/DL
WBC # BLD AUTO: 7 K/UL (ref 4.8–10.8)
WBC #/AREA URNS HPF: ABNORMAL /HPF

## 2023-11-15 PROCEDURE — 36415 COLL VENOUS BLD VENIPUNCTURE: CPT

## 2023-11-15 PROCEDURE — 71045 X-RAY EXAM CHEST 1 VIEW: CPT

## 2023-11-15 PROCEDURE — 700102 HCHG RX REV CODE 250 W/ 637 OVERRIDE(OP): Performed by: HOSPITALIST

## 2023-11-15 PROCEDURE — C9803 HOPD COVID-19 SPEC COLLECT: HCPCS | Performed by: EMERGENCY MEDICINE

## 2023-11-15 PROCEDURE — 84484 ASSAY OF TROPONIN QUANT: CPT

## 2023-11-15 PROCEDURE — 83605 ASSAY OF LACTIC ACID: CPT

## 2023-11-15 PROCEDURE — 87086 URINE CULTURE/COLONY COUNT: CPT

## 2023-11-15 PROCEDURE — 96375 TX/PRO/DX INJ NEW DRUG ADDON: CPT

## 2023-11-15 PROCEDURE — 99223 1ST HOSP IP/OBS HIGH 75: CPT | Mod: AI | Performed by: HOSPITALIST

## 2023-11-15 PROCEDURE — 83880 ASSAY OF NATRIURETIC PEPTIDE: CPT

## 2023-11-15 PROCEDURE — 700111 HCHG RX REV CODE 636 W/ 250 OVERRIDE (IP): Performed by: HOSPITALIST

## 2023-11-15 PROCEDURE — 85025 COMPLETE CBC W/AUTO DIFF WBC: CPT

## 2023-11-15 PROCEDURE — A9270 NON-COVERED ITEM OR SERVICE: HCPCS | Performed by: HOSPITALIST

## 2023-11-15 PROCEDURE — 87186 SC STD MICRODIL/AGAR DIL: CPT

## 2023-11-15 PROCEDURE — 96374 THER/PROPH/DIAG INJ IV PUSH: CPT

## 2023-11-15 PROCEDURE — 700111 HCHG RX REV CODE 636 W/ 250 OVERRIDE (IP): Mod: JZ | Performed by: EMERGENCY MEDICINE

## 2023-11-15 PROCEDURE — 99285 EMERGENCY DEPT VISIT HI MDM: CPT

## 2023-11-15 PROCEDURE — 87040 BLOOD CULTURE FOR BACTERIA: CPT

## 2023-11-15 PROCEDURE — 80053 COMPREHEN METABOLIC PANEL: CPT

## 2023-11-15 PROCEDURE — 93005 ELECTROCARDIOGRAM TRACING: CPT | Performed by: EMERGENCY MEDICINE

## 2023-11-15 PROCEDURE — 81001 URINALYSIS AUTO W/SCOPE: CPT

## 2023-11-15 PROCEDURE — 0241U HCHG SARS-COV-2 COVID-19 NFCT DS RESP RNA 4 TRGT MIC: CPT

## 2023-11-15 PROCEDURE — 84145 PROCALCITONIN (PCT): CPT

## 2023-11-15 PROCEDURE — 87077 CULTURE AEROBIC IDENTIFY: CPT | Mod: 91

## 2023-11-15 PROCEDURE — 770020 HCHG ROOM/CARE - TELE (206)

## 2023-11-15 RX ORDER — OMEPRAZOLE 20 MG/1
20 CAPSULE, DELAYED RELEASE ORAL EVERY MORNING
Status: DISCONTINUED | OUTPATIENT
Start: 2023-11-16 | End: 2023-11-17 | Stop reason: HOSPADM

## 2023-11-15 RX ORDER — MIRTAZAPINE 15 MG/1
7.5 TABLET, FILM COATED ORAL NIGHTLY
Status: DISCONTINUED | OUTPATIENT
Start: 2023-11-15 | End: 2023-11-17 | Stop reason: HOSPADM

## 2023-11-15 RX ORDER — HALOPERIDOL 5 MG/ML
1 INJECTION INTRAMUSCULAR EVERY 4 HOURS PRN
Status: DISCONTINUED | OUTPATIENT
Start: 2023-11-15 | End: 2023-11-17 | Stop reason: HOSPADM

## 2023-11-15 RX ORDER — ACETAMINOPHEN 325 MG/1
650 TABLET ORAL EVERY 6 HOURS PRN
Status: DISCONTINUED | OUTPATIENT
Start: 2023-11-15 | End: 2023-11-17 | Stop reason: HOSPADM

## 2023-11-15 RX ORDER — CEFTRIAXONE 2 G/1
2000 INJECTION, POWDER, FOR SOLUTION INTRAMUSCULAR; INTRAVENOUS ONCE
Status: COMPLETED | OUTPATIENT
Start: 2023-11-15 | End: 2023-11-15

## 2023-11-15 RX ORDER — ONDANSETRON 4 MG/1
4 TABLET, ORALLY DISINTEGRATING ORAL EVERY 4 HOURS PRN
Status: DISCONTINUED | OUTPATIENT
Start: 2023-11-15 | End: 2023-11-17 | Stop reason: HOSPADM

## 2023-11-15 RX ORDER — LABETALOL HYDROCHLORIDE 5 MG/ML
10 INJECTION, SOLUTION INTRAVENOUS EVERY 4 HOURS PRN
Status: DISCONTINUED | OUTPATIENT
Start: 2023-11-15 | End: 2023-11-17 | Stop reason: HOSPADM

## 2023-11-15 RX ORDER — ONDANSETRON 2 MG/ML
4 INJECTION INTRAMUSCULAR; INTRAVENOUS EVERY 4 HOURS PRN
Status: DISCONTINUED | OUTPATIENT
Start: 2023-11-15 | End: 2023-11-17 | Stop reason: HOSPADM

## 2023-11-15 RX ORDER — QUETIAPINE FUMARATE 25 MG/1
25 TABLET, FILM COATED ORAL 2 TIMES DAILY
Status: DISCONTINUED | OUTPATIENT
Start: 2023-11-15 | End: 2023-11-17 | Stop reason: HOSPADM

## 2023-11-15 RX ORDER — FUROSEMIDE 10 MG/ML
20 INJECTION INTRAMUSCULAR; INTRAVENOUS
Status: DISCONTINUED | OUTPATIENT
Start: 2023-11-16 | End: 2023-11-17 | Stop reason: HOSPADM

## 2023-11-15 RX ADMIN — MIRTAZAPINE 7.5 MG: 15 TABLET, FILM COATED ORAL at 20:27

## 2023-11-15 RX ADMIN — CEFTRIAXONE SODIUM 2000 MG: 2 INJECTION, POWDER, FOR SOLUTION INTRAMUSCULAR; INTRAVENOUS at 17:03

## 2023-11-15 RX ADMIN — LABETALOL HYDROCHLORIDE 10 MG: 5 INJECTION, SOLUTION INTRAVENOUS at 18:35

## 2023-11-15 ASSESSMENT — FIBROSIS 4 INDEX
FIB4 SCORE: 1.81
FIB4 SCORE: 2.01

## 2023-11-15 ASSESSMENT — PAIN DESCRIPTION - PAIN TYPE: TYPE: ACUTE PAIN

## 2023-11-15 NOTE — ED PROVIDER NOTES
ED Provider Note    CHIEF COMPLAINT  Chief Complaint   Patient presents with    Weakness     BIB EMS from home for increased weakness x1 day. Pt lives with friends and per EMS they noticed she was more weak this morning than normal. Pt is baseline A&Ox1. -stroke assessment.        HPI  Tracy Puente is a 82 y.o. female who presents for evaluation of weakness.  Patient's roommate apparently called EMS due to patient acting abnormally.  They feel she is less able to take care of herself this morning and that the symptoms may have started sometime yesterday.  Patient normally is only oriented to her name.  Patient's son apparently noted that he received a message from the patient's caregiver and she is having difficulty sitting up, slouching, and is slurring her words.   EXTERNAL RECORDS REVIEWED  Reviewed telephone note to patient's primary care coordinator.  Patient apparently has a history of multi-infarct dementia.  Reviewed her last office visit on 17 October.  Patient's son Wilfrido is the DURABLE POWER OF .  ROS  Constitutional: No fevers or chills  HEENT: No sore throat, or runny nose  Neck: No neck pain  Chest: No pain   Pulm: No shortness of breath, cough, or pain with inspiration.  Gastrointestinal: No nausea, vomiting, diarrhea, or abdominal pain.  Genitourinary: No dysuria or hematuria  Musculoskeletal: No pain or swelling to extremities.  No focal weakness.  Neurologic: Denies any numbness, tingling, or focal motor weakness.  Oriented to name only.  Heme: No bleeding or bruising problems.   Immuno: No hx of recurrent infections        LIMITATION TO HISTORY   None   OUTSIDE HISTORIAN(S):  None        PAST FAM HISTORY  Family History   Problem Relation Age of Onset    Breast Cancer Sister     Colorectal Cancer Neg Hx     Peritoneal Cancer Neg Hx     Tubal Cancer Neg Hx     Ovarian Cancer Neg Hx     Bilateral Breast Cancer Neg Hx        PAST MEDICAL HISTORY  Vitamin D deficiency, urinary tract infection  "recently, stage IIIb chronic kidney disease, poor appetite, history of dysphagia, history of insomnia, multi-infarct dementia, hypertension, debility with patient being wheelchair-bound and requiring 1-2 in person assist for transfers, polyarthritis, likely osteoarthritis.    SOCIAL HISTORY  Social History     Tobacco Use    Smoking status: Former     Current packs/day: 0.00     Types: Cigarettes     Quit date:      Years since quittin.9    Smokeless tobacco: Never   Vaping Use    Vaping Use: Never used   Substance and Sexual Activity    Alcohol use: Not Currently    Drug use: Never    Sexual activity: Not on file       SURGICAL HISTORY   has a past surgical history that includes bunionectomy.    CURRENT MEDICATIONS  Home Medications       Reviewed by Bambi Leon (Pharmacy Tech) on 11/15/23 at 1648  Med List Status: Complete     Medication Last Dose Status   celecoxib (CELEBREX) 100 MG Cap UNK Active   hydrocortisone 2.5 % Cream topical cream UNK Active   mirtazapine (REMERON) 7.5 MG tablet UNK Active   ondansetron (ZOFRAN) 8 MG Tab UNK Active   pantoprazole (PROTONIX) 20 MG tablet UNK Active   QUEtiapine (SEROQUEL) 25 MG Tab UNK Active   vitamin D2, Ergocalciferol, (DRISDOL) 1.25 MG (66260 UT) Cap capsule UNK Active                     ALLERGIES  Allergies   Allergen Reactions    Sulfamethoxazole W-Trimethoprim Itching       PHYSICAL EXAM  VITAL SIGNS: BP (!) 188/97   Pulse (!) 102   Temp 37.6 °C (99.7 °F) (Temporal)   Resp 18   Ht 1.575 m (5' 2\")   Wt 59 kg (130 lb)   SpO2 93%   BMI 23.78 kg/m²    Gen: Appears to be sleeping in right lateral recumbent position.  Wakes easily, no distress, smiles.  HEENT: No signs of trauma, Bilateral external ears normal, Nose normal. Conjunctiva normal, Non-icteric.   Neck:  No tenderness, Supple, No masses  Lymphatic: No cervical lymphadenopathy noted.   Cardiovascular: Tachycardia with regular rhythm.  Capillary refill less than 3 seconds to all " extremities, 2+ distal pulses.  Thorax & Lungs: Normal breath sounds, No respiratory distress, No wheezing bilateral chest rise  Abdomen: Bowel sounds normal, Soft, No tenderness, No masses, No pulsatile masses. No Guarding or rebound  Skin: Warm, Dry, No erythema, No rash noted to exposed areas.   Back: No bony tenderness, No CVA tenderness.   Extremities: Intact distal pulses, No edema  Neurologic: Appears tired but no facial droop, grossly normal coordination but generalized weakness to all extremities equally.  Psychiatric: Affect pleasant    INITIAL IMPRESSION  Patient arrives for evaluation of what appears to be generalized weakness of unclear etiology.  She is unable to elaborate on symptoms but does not appear particularly distressed.  She has no signs to suggest trauma and does not appear septic or toxic but, unfortunately, she is very weak at baseline and appears that she is even weaker now.  Is very likely she will need to be admitted for this weakness as she is not safe to go home.  We will perform screening labs, urinalysis, x-ray, and cardiac enzymes.    ED observation? No    LABS  Results for orders placed or performed during the hospital encounter of 11/15/23   CBC WITH DIFFERENTIAL   Result Value Ref Range    WBC 7.0 4.8 - 10.8 K/uL    RBC 5.00 4.20 - 5.40 M/uL    Hemoglobin 14.2 12.0 - 16.0 g/dL    Hematocrit 42.7 37.0 - 47.0 %    MCV 85.4 81.4 - 97.8 fL    MCH 28.4 27.0 - 33.0 pg    MCHC 33.3 32.2 - 35.5 g/dL    RDW 44.7 35.9 - 50.0 fL    Platelet Count 253 164 - 446 K/uL    MPV 10.2 9.0 - 12.9 fL    Neutrophils-Polys 78.70 (H) 44.00 - 72.00 %    Lymphocytes 12.50 (L) 22.00 - 41.00 %    Monocytes 7.70 0.00 - 13.40 %    Eosinophils 0.70 0.00 - 6.90 %    Basophils 0.10 0.00 - 1.80 %    Immature Granulocytes 0.30 0.00 - 0.90 %    Nucleated RBC 0.00 0.00 - 0.20 /100 WBC    Neutrophils (Absolute) 5.52 1.82 - 7.42 K/uL    Lymphs (Absolute) 0.88 (L) 1.00 - 4.80 K/uL    Monos (Absolute) 0.54 0.00 - 0.85  K/uL    Eos (Absolute) 0.05 0.00 - 0.51 K/uL    Baso (Absolute) 0.01 0.00 - 0.12 K/uL    Immature Granulocytes (abs) 0.02 0.00 - 0.11 K/uL    NRBC (Absolute) 0.00 K/uL   COMP METABOLIC PANEL   Result Value Ref Range    Sodium 141 135 - 145 mmol/L    Potassium 3.8 3.6 - 5.5 mmol/L    Chloride 106 96 - 112 mmol/L    Co2 22 20 - 33 mmol/L    Anion Gap 13.0 7.0 - 16.0    Glucose 107 (H) 65 - 99 mg/dL    Bun 16 8 - 22 mg/dL    Creatinine 1.16 0.50 - 1.40 mg/dL    Calcium 9.5 8.5 - 10.5 mg/dL    Correct Calcium 9.5 8.5 - 10.5 mg/dL    AST(SGOT) 21 12 - 45 U/L    ALT(SGPT) 12 2 - 50 U/L    Alkaline Phosphatase 70 30 - 99 U/L    Total Bilirubin 0.7 0.1 - 1.5 mg/dL    Albumin 4.0 3.2 - 4.9 g/dL    Total Protein 6.9 6.0 - 8.2 g/dL    Globulin 2.9 1.9 - 3.5 g/dL    A-G Ratio 1.4 g/dL   CoV-2, Flu A/B, And RSV by PCR ([employer])    Specimen: Mid-Turbinate; Respirate   Result Value Ref Range    Influenza virus A RNA Negative Negative    Influenza virus B, PCR Negative Negative    RSV, PCR Negative Negative    SARS-CoV-2 by PCR NotDetected     SARS-CoV-2 Source NP Swab    TROPONIN   Result Value Ref Range    Troponin T 27 (H) 6 - 19 ng/L   LACTIC ACID   Result Value Ref Range    Lactic Acid 1.1 0.5 - 2.0 mmol/L   proBrain Natriuretic Peptide, NT   Result Value Ref Range    NT-proBNP 9043 (H) 0 - 125 pg/mL   URINALYSIS CULTURE, IF INDICATED    Specimen: Urine, Cath   Result Value Ref Range    Color DK Yellow     Character Cloudy (A)     Specific Gravity 1.019 <1.035    Ph 5.5 5.0 - 8.0    Glucose Negative Negative mg/dL    Ketones 15 (A) Negative mg/dL    Protein 100 (A) Negative mg/dL    Bilirubin Negative Negative    Urobilinogen, Urine 1.0 Negative    Nitrite Negative Negative    Leukocyte Esterase Moderate (A) Negative    Occult Blood Negative Negative    Micro Urine Req Microscopic    PROCALCITONIN   Result Value Ref Range    Procalcitonin 0.17 <0.25 ng/mL   URINE MICROSCOPIC (W/UA)   Result Value Ref Range    WBC  (A)  /hpf    RBC 0-2 /hpf    Bacteria Many (A) None /hpf    Epithelial Cells Few /hpf   ESTIMATED GFR   Result Value Ref Range    GFR (CKD-EPI) 46 (A) >60 mL/min/1.73 m 2   EKG (NOW)   Result Value Ref Range    Report       [facility]    Test Date:  2023-11-15  Pt Name:    RIGO GAXIOLA                 Department: ER  MRN:        [id number]                      Room:        32  Gender:     Female                       Technician: [id number]  :        [id number]                   Requested By:SHAWN PADRON  Order #:    [id number]                    Reading MD:    Measurements  Intervals                                Axis  Rate:       99                           P:          77  AK:         173                          QRS:        57  QRSD:       86                           T:          113  QT:         317  QTc:        407    Interpretive Statements  Sinus rhythm  Right atrial enlargement  Nonspecific repol abnormality, diffuse leads  Compared to ECG 2023 12:29:25  Atrial abnormality now present  Inferior Q waves no longer present  Q waves no longer present       I have independently interpreted this EKG  Sinus tachycardia with a rate of 99, intervals appear to be within normal limits, there are no ST or T wave changes to suggest ischemia, there are no findings to suggest ectopy.  There do not appear to be any significant changes when compared to EKG performed in July of this year.  I have independently interpreted the diagnostic imaging associated with this visit and am waiting the final reading from the radiologist.   My preliminary interpretation is a follows: Single view chest x-ray: There are no pulmonary opacities to suggest infiltrates or effusions.  Cardiac silhouette appears to have no acute findings.  No bony abnormalities.  RADIOLOGY  DX-CHEST-PORTABLE (1 VIEW)   Final Result      No acute cardiac or pulmonary abnormalities are identified.      EC-ECHOCARDIOGRAM COMPLETE  W/O CONT    (Results Pending)         COURSE & MEDICAL DECISION MAKING  Pertinent Labs & Imaging studies reviewed. (See chart for details)  Patient's laboratory evaluation is consistent with a urinary tract infection which is likely the source of her increased weakness.  She does have an elevated heart rate but does not have an elevated temperature.  I feel she is at high risk for sepsis and will benefit from inpatient treatment and IV antibiotics.  There is also notable that she has hypoxemia of unclear etiology.  This may simply be hypoventilation but she does not have any chest pain to suggest it is related to ACS, PE, or heart failure.  Patient was discussed with the hospitalist will evaluate the patient for treatment in the hospital.      I have discussed management of the patient with the following physicians and PRADEEP's: Hospitalist    Escalation of care considered, and ultimately not performed: CT imaging considered however felt to be unnecessary emergently.  Will defer to hospitalist service    Barriers to care at this time, including but not limited to: Patient is only A&O x1..     Decision tools and Rx drugs considered including, but not limited to :Antibiotics        Discussion of management with other QHP or appropriate source(s): None      FINAL IMPRESSION  1. Urinary tract infection in elderly patient    2. Hypoxia    3. Debility        Electronically signed by: Yong Benoit M.D., 11/15/2023 2:27 PM

## 2023-11-15 NOTE — ED TRIAGE NOTES
.  Chief Complaint   Patient presents with    Weakness     BIB EMS from home for increased weakness x1 day. Pt lives with friends and per EMS they noticed she was more weak this morning than normal. Pt is baseline A&Ox1. -stroke assessment.       Pt denies any pain or needs. FSBG 148. Bed alarm in place.

## 2023-11-15 NOTE — ED NOTES
PIV established, blood collected and sent to lab. Covid swab collected and sent to lab. Urine collected and sent to lab.

## 2023-11-16 ENCOUNTER — HOME HEALTH ADMISSION (OUTPATIENT)
Dept: HOME HEALTH SERVICES | Facility: HOME HEALTHCARE | Age: 83
End: 2023-11-16
Payer: MEDICARE

## 2023-11-16 ENCOUNTER — APPOINTMENT (OUTPATIENT)
Dept: CARDIOLOGY | Facility: MEDICAL CENTER | Age: 83
DRG: 689 | End: 2023-11-16
Attending: HOSPITALIST
Payer: MEDICARE

## 2023-11-16 PROBLEM — I16.0 HYPERTENSIVE URGENCY: Status: ACTIVE | Noted: 2023-06-01

## 2023-11-16 LAB
ALBUMIN SERPL BCP-MCNC: 3.3 G/DL (ref 3.2–4.9)
ALBUMIN/GLOB SERPL: 1.3 G/DL
ALP SERPL-CCNC: 60 U/L (ref 30–99)
ALT SERPL-CCNC: 8 U/L (ref 2–50)
ANION GAP SERPL CALC-SCNC: 13 MMOL/L (ref 7–16)
AST SERPL-CCNC: 13 U/L (ref 12–45)
BASOPHILS # BLD AUTO: 0.2 % (ref 0–1.8)
BASOPHILS # BLD: 0.01 K/UL (ref 0–0.12)
BILIRUB SERPL-MCNC: 0.3 MG/DL (ref 0.1–1.5)
BUN SERPL-MCNC: 21 MG/DL (ref 8–22)
CALCIUM ALBUM COR SERPL-MCNC: 9.3 MG/DL (ref 8.5–10.5)
CALCIUM SERPL-MCNC: 8.7 MG/DL (ref 8.5–10.5)
CHLORIDE SERPL-SCNC: 107 MMOL/L (ref 96–112)
CO2 SERPL-SCNC: 22 MMOL/L (ref 20–33)
CREAT SERPL-MCNC: 1.15 MG/DL (ref 0.5–1.4)
EOSINOPHIL # BLD AUTO: 0.11 K/UL (ref 0–0.51)
EOSINOPHIL NFR BLD: 2 % (ref 0–6.9)
ERYTHROCYTE [DISTWIDTH] IN BLOOD BY AUTOMATED COUNT: 46.1 FL (ref 35.9–50)
GFR SERPLBLD CREATININE-BSD FMLA CKD-EPI: 47 ML/MIN/1.73 M 2
GLOBULIN SER CALC-MCNC: 2.5 G/DL (ref 1.9–3.5)
GLUCOSE SERPL-MCNC: 96 MG/DL (ref 65–99)
HCT VFR BLD AUTO: 38.2 % (ref 37–47)
HGB BLD-MCNC: 12.5 G/DL (ref 12–16)
IMM GRANULOCYTES # BLD AUTO: 0.01 K/UL (ref 0–0.11)
IMM GRANULOCYTES NFR BLD AUTO: 0.2 % (ref 0–0.9)
LV EJECT FRACT  99904: 60
LV EJECT FRACT MOD 2C 99903: 46.76
LV EJECT FRACT MOD 4C 99902: 51.99
LV EJECT FRACT MOD BP 99901: 46.91
LYMPHOCYTES # BLD AUTO: 1.07 K/UL (ref 1–4.8)
LYMPHOCYTES NFR BLD: 19.5 % (ref 22–41)
MCH RBC QN AUTO: 28.3 PG (ref 27–33)
MCHC RBC AUTO-ENTMCNC: 32.7 G/DL (ref 32.2–35.5)
MCV RBC AUTO: 86.6 FL (ref 81.4–97.8)
MONOCYTES # BLD AUTO: 0.6 K/UL (ref 0–0.85)
MONOCYTES NFR BLD AUTO: 10.9 % (ref 0–13.4)
NEUTROPHILS # BLD AUTO: 3.68 K/UL (ref 1.82–7.42)
NEUTROPHILS NFR BLD: 67.2 % (ref 44–72)
NRBC # BLD AUTO: 0 K/UL
NRBC BLD-RTO: 0 /100 WBC (ref 0–0.2)
PLATELET # BLD AUTO: 229 K/UL (ref 164–446)
PMV BLD AUTO: 10.2 FL (ref 9–12.9)
POTASSIUM SERPL-SCNC: 3.8 MMOL/L (ref 3.6–5.5)
PROT SERPL-MCNC: 5.8 G/DL (ref 6–8.2)
RBC # BLD AUTO: 4.41 M/UL (ref 4.2–5.4)
SODIUM SERPL-SCNC: 142 MMOL/L (ref 135–145)
WBC # BLD AUTO: 5.5 K/UL (ref 4.8–10.8)

## 2023-11-16 PROCEDURE — 36415 COLL VENOUS BLD VENIPUNCTURE: CPT

## 2023-11-16 PROCEDURE — 93306 TTE W/DOPPLER COMPLETE: CPT

## 2023-11-16 PROCEDURE — 80053 COMPREHEN METABOLIC PANEL: CPT

## 2023-11-16 PROCEDURE — A9270 NON-COVERED ITEM OR SERVICE: HCPCS | Performed by: STUDENT IN AN ORGANIZED HEALTH CARE EDUCATION/TRAINING PROGRAM

## 2023-11-16 PROCEDURE — 92610 EVALUATE SWALLOWING FUNCTION: CPT

## 2023-11-16 PROCEDURE — 99233 SBSQ HOSP IP/OBS HIGH 50: CPT | Performed by: STUDENT IN AN ORGANIZED HEALTH CARE EDUCATION/TRAINING PROGRAM

## 2023-11-16 PROCEDURE — 93306 TTE W/DOPPLER COMPLETE: CPT | Mod: 26 | Performed by: INTERNAL MEDICINE

## 2023-11-16 PROCEDURE — 97166 OT EVAL MOD COMPLEX 45 MIN: CPT

## 2023-11-16 PROCEDURE — 700102 HCHG RX REV CODE 250 W/ 637 OVERRIDE(OP): Performed by: STUDENT IN AN ORGANIZED HEALTH CARE EDUCATION/TRAINING PROGRAM

## 2023-11-16 PROCEDURE — A9270 NON-COVERED ITEM OR SERVICE: HCPCS | Performed by: HOSPITALIST

## 2023-11-16 PROCEDURE — 700101 HCHG RX REV CODE 250: Performed by: HOSPITALIST

## 2023-11-16 PROCEDURE — 770001 HCHG ROOM/CARE - MED/SURG/GYN PRIV*

## 2023-11-16 PROCEDURE — 85025 COMPLETE CBC W/AUTO DIFF WBC: CPT

## 2023-11-16 PROCEDURE — 97161 PT EVAL LOW COMPLEX 20 MIN: CPT

## 2023-11-16 PROCEDURE — 700111 HCHG RX REV CODE 636 W/ 250 OVERRIDE (IP): Performed by: HOSPITALIST

## 2023-11-16 PROCEDURE — 700102 HCHG RX REV CODE 250 W/ 637 OVERRIDE(OP): Performed by: HOSPITALIST

## 2023-11-16 PROCEDURE — 97535 SELF CARE MNGMENT TRAINING: CPT

## 2023-11-16 RX ORDER — LOSARTAN POTASSIUM 50 MG/1
50 TABLET ORAL
Status: DISCONTINUED | OUTPATIENT
Start: 2023-11-16 | End: 2023-11-17 | Stop reason: HOSPADM

## 2023-11-16 RX ADMIN — FUROSEMIDE 20 MG: 10 INJECTION, SOLUTION INTRAVENOUS at 05:17

## 2023-11-16 RX ADMIN — MIRTAZAPINE 7.5 MG: 15 TABLET, FILM COATED ORAL at 20:44

## 2023-11-16 RX ADMIN — RIVAROXABAN 10 MG: 10 TABLET, FILM COATED ORAL at 17:32

## 2023-11-16 RX ADMIN — LOSARTAN POTASSIUM 50 MG: 50 TABLET, FILM COATED ORAL at 09:20

## 2023-11-16 RX ADMIN — QUETIAPINE FUMARATE 25 MG: 25 TABLET ORAL at 05:17

## 2023-11-16 RX ADMIN — CEFTRIAXONE SODIUM 1000 MG: 10 INJECTION, POWDER, FOR SOLUTION INTRAVENOUS at 05:17

## 2023-11-16 RX ADMIN — QUETIAPINE FUMARATE 25 MG: 25 TABLET ORAL at 17:32

## 2023-11-16 ASSESSMENT — PAIN DESCRIPTION - PAIN TYPE
TYPE: ACUTE PAIN
TYPE: ACUTE PAIN

## 2023-11-16 ASSESSMENT — COGNITIVE AND FUNCTIONAL STATUS - GENERAL
MOBILITY SCORE: 6
TURNING FROM BACK TO SIDE WHILE IN FLAT BAD: A LOT
CLIMB 3 TO 5 STEPS WITH RAILING: TOTAL
PERSONAL GROOMING: A LITTLE
SUGGESTED CMS G CODE MODIFIER MOBILITY: CM
PERSONAL GROOMING: A LOT
SUGGESTED CMS G CODE MODIFIER DAILY ACTIVITY: CL
MOVING TO AND FROM BED TO CHAIR: A LOT
EATING MEALS: A LOT
CLIMB 3 TO 5 STEPS WITH RAILING: TOTAL
WALKING IN HOSPITAL ROOM: TOTAL
SUGGESTED CMS G CODE MODIFIER MOBILITY: CN
MOVING TO AND FROM BED TO CHAIR: UNABLE
STANDING UP FROM CHAIR USING ARMS: TOTAL
MOVING FROM LYING ON BACK TO SITTING ON SIDE OF FLAT BED: UNABLE
DRESSING REGULAR UPPER BODY CLOTHING: A LOT
HELP NEEDED FOR BATHING: TOTAL
TURNING FROM BACK TO SIDE WHILE IN FLAT BAD: UNABLE
DAILY ACTIVITIY SCORE: 10
DAILY ACTIVITIY SCORE: 11
TOILETING: TOTAL
WALKING IN HOSPITAL ROOM: TOTAL
EATING MEALS: A LITTLE
STANDING UP FROM CHAIR USING ARMS: TOTAL
DRESSING REGULAR LOWER BODY CLOTHING: TOTAL
MOVING FROM LYING ON BACK TO SITTING ON SIDE OF FLAT BED: UNABLE
DRESSING REGULAR LOWER BODY CLOTHING: TOTAL
MOBILITY SCORE: 8
DRESSING REGULAR UPPER BODY CLOTHING: A LOT
TOILETING: A LOT
SUGGESTED CMS G CODE MODIFIER DAILY ACTIVITY: CL
HELP NEEDED FOR BATHING: TOTAL

## 2023-11-16 ASSESSMENT — ACTIVITIES OF DAILY LIVING (ADL): TOILETING: REQUIRES ASSIST

## 2023-11-16 ASSESSMENT — FIBROSIS 4 INDEX: FIB4 SCORE: 1.69

## 2023-11-16 NOTE — ED NOTES
Patient stating she needs to pee, offered bedpan - pt refused stating she does not need to pee she needs to sleep. Will continue to round for bathroom needs. No distress noted.

## 2023-11-16 NOTE — ASSESSMENT & PLAN NOTE
Monitor BMP and assess response  Avoid IV contrast/nephrotoxins/NSAIDs  Dose adjust meds for decreased GFR

## 2023-11-16 NOTE — PROGRESS NOTES
Monitor Summary     Rhythm: SR  Heart Rate: 76-90  Ectopy: R PVC  Measurement: .14/.09/.37

## 2023-11-16 NOTE — H&P
Hospital Medicine History & Physical Note    Date of Service  11/15/2023    Primary Care Physician  Kaity Guy P.A.-C.    Consultants      Specialist Names:     Code Status  Full Code    Chief Complaint  Chief Complaint   Patient presents with    Weakness     BIB EMS from home for increased weakness x1 day. Pt lives with friends and per EMS they noticed she was more weak this morning than normal. Pt is baseline A&Ox1. -stroke assessment.        History of Presenting Illness  Tracy Puente is a 82 y.o. female who presented 11/15/2023 with past medical history of dementia, recurrent UTI, CKD stage III, hypertension, rheumatoid arthritis, who presents to the hospital for generalized weakness and hypoxia.  Patient is baseline confused.  Patient states for the past 3 days she has been coughing and difficulty urinating.  She denies any nausea, vomiting, diarrhea, fevers.    Chest x-ray interpreted by me found mild pulmonary edema  EKG interpreted by me found sinus tachycardia without ST segment changes  I discussed the plan of care with patient.    Review of Systems  Review of Systems   Unable to perform ROS: Acuity of condition       Past Medical History  Hypertension    Surgical History   has a past surgical history that includes bunionectomy.     Family History  family history includes Breast Cancer in her sister.   Family history reviewed with patient. There is no family history that is pertinent to the chief complaint.     Social History   reports that she quit smoking about 43 years ago. Her smoking use included cigarettes. She has never used smokeless tobacco. She reports that she does not currently use alcohol. She reports that she does not use drugs.    Allergies  Allergies   Allergen Reactions    Sulfamethoxazole W-Trimethoprim Itching       Medications  Prior to Admission Medications   Prescriptions Last Dose Informant Patient Reported? Taking?   QUEtiapine (SEROQUEL) 25 MG Tab UNK at [facility] Patient's Home  Pharmacy No No   Sig: Take 1 Tablet by mouth 2 times a day.   celecoxib (CELEBREX) 100 MG Cap UNK at [facility] Patient's Home Pharmacy No No   Sig: Take 1 Capsule by mouth every morning.   hydrocortisone 2.5 % Cream topical cream UNK at [facility] Patient's Home Pharmacy No No   Sig: Apply 1 Application topically 2 times a day as needed (hemorrhoid).   mirtazapine (REMERON) 7.5 MG tablet UNK at [facility] Patient's Home Pharmacy No No   Sig: Take 1 Tablet by mouth every evening. Indications: Memory   ondansetron (ZOFRAN) 8 MG Tab UNK at [facility] Patient's Home Pharmacy No No   Sig: Take 1 Tablet by mouth every 8 hours as needed for Nausea/Vomiting. Indications: Nausea   pantoprazole (PROTONIX) 20 MG tablet UNK at [facility] Patient's Home Pharmacy No No   Sig: Take 1 Tablet by mouth every morning. Indications: Heartburn   vitamin D2, Ergocalciferol, (DRISDOL) 1.25 MG (40490 UT) Cap capsule UNK at [facility] Patient's Home Pharmacy No No   Sig: Take 1 Capsule by mouth every 7 days. Indications: Vitamin D Deficiency      Facility-Administered Medications: None       Physical Exam  Temp:  [37.6 °C (99.7 °F)] 37.6 °C (99.7 °F)  Pulse:  [100-102] 102  Resp:  [16-18] 18  BP: (150-188)/(75-97) 188/97  SpO2:  [90 %-93 %] 93 %  Blood Pressure : (!) 150/75   Temperature: 37.6 °C (99.7 °F)   Pulse: 100   Respiration: 16   Pulse Oximetry: 90 %       Physical Exam  Vitals and nursing note reviewed.   Constitutional:       General: She is not in acute distress.     Appearance: Normal appearance. She is not ill-appearing, toxic-appearing or diaphoretic.   HENT:      Head: Normocephalic and atraumatic.      Nose: No congestion or rhinorrhea.      Mouth/Throat:      Pharynx: No oropharyngeal exudate or posterior oropharyngeal erythema.   Eyes:      General: No scleral icterus.  Neck:      Vascular: JVD present. No carotid bruit.   Cardiovascular:      Rate and Rhythm: Normal rate and regular rhythm.      Pulses: Normal pulses.      Heart sounds: Normal heart sounds. No  murmur heard.     No friction rub. No gallop.   Pulmonary:      Effort: Pulmonary effort is normal. No respiratory distress.      Breath sounds: No stridor. Rales present. No wheezing or rhonchi.   Abdominal:      General: Abdomen is flat. There is no distension.      Palpations: There is no mass.      Tenderness: There is no abdominal tenderness. There is no left CVA tenderness, guarding or rebound.      Hernia: No hernia is present.   Musculoskeletal:         General: No swelling. Normal range of motion.      Cervical back: No rigidity. No muscular tenderness.      Right lower leg: Edema present.      Left lower leg: Edema present.   Lymphadenopathy:      Cervical: No cervical adenopathy.   Skin:     General: Skin is warm and dry.      Capillary Refill: Capillary refill takes more than 3 seconds.      Coloration: Skin is not jaundiced or pale.      Findings: No bruising or erythema.   Neurological:      Mental Status: She is alert.         Laboratory:  Recent Labs     11/15/23  1456   WBC 7.0   RBC 5.00   HEMOGLOBIN 14.2   HEMATOCRIT 42.7   MCV 85.4   MCH 28.4   MCHC 33.3   RDW 44.7   PLATELETCT 253   MPV 10.2     Recent Labs     11/15/23  1456   SODIUM 141   POTASSIUM 3.8   CHLORIDE 106   CO2 22   GLUCOSE 107*   BUN 16   CREATININE 1.16   CALCIUM 9.5     Recent Labs     11/15/23  1456   ALTSGPT 12   ASTSGOT 21   ALKPHOSPHAT 70   TBILIRUBIN 0.7   GLUCOSE 107*         Recent Labs     11/15/23  1456   NTPROBNP 9043*         Recent Labs     11/15/23  1456   TROPONINT 27*       Imaging:  DX-CHEST-PORTABLE (1 VIEW)   Final Result      No acute cardiac or pulmonary abnormalities are identified.      EC-ECHOCARDIOGRAM COMPLETE W/O CONT    (Results Pending)       X-Ray:  I have personally reviewed the images and compared with prior images.  EKG:  I have personally reviewed the images and compared with prior images.    Assessment/Plan:  Justification for Admission Status  I anticipate this patient will require at least two  midnights for appropriate medical management, necessitating inpatient admission because hypoxia and UTI    Patient will need a Med/Surg bed on MEDICAL service .  The need is secondary to hypoxia and UTI.    * Acute cystitis without hematuria- (present on admission)  Assessment & Plan  UA + for bacteria, LE and nitrite  F/u urine cultures  Started pt on ceftriaxone    Acute respiratory failure with hypoxia (HCC)  Assessment & Plan  On 3 L of O2 above baseline    Acute pulmonary edema (HCC)  Assessment & Plan  Strict ins and outs and daily weights  Monitor on telemetry  IV Lasix  Cardiac echo has been ordered    History of rheumatoid arthritis- (present on admission)  Assessment & Plan  Not on a DMARD    Debility- (present on admission)  Assessment & Plan  PT OT eval    Stage 3b chronic kidney disease (HCC)- (present on admission)  Assessment & Plan  Monitor BMP and assess response  Avoid IV contrast/nephrotoxins/NSAIDs  Dose adjust meds for decreased GFR      Elevated troponin- (present on admission)  Assessment & Plan  Secondary to renal failure  Continue to trend    Primary hypertension- (present on admission)  Assessment & Plan  Uncontrolled  IV as needed medications        VTE prophylaxis: SCDs/TEDs

## 2023-11-16 NOTE — DISCHARGE PLANNING
Received referral for [facility]. We are unable to accept referral D/T [facility] doesn't have caregivers capacity at this time.CM notified via [employer].

## 2023-11-16 NOTE — DISCHARGE PLANNING
----- Message from Kelli Neal Please have Tiffany call [facility]      Kelli Neal Case Management Discharge Planning    Admission Date: 11/15/2023  GMLOS: 3.9  ALOS: 1    6-Clicks ADL Score: 10  6-Clicks Mobility Score: 8  PT and/or OT Eval ordered: Yes  Post-acute Referrals Ordered: No  Post-acute Choice Obtained: No  Has referral(s) been sent to post-acute provider:  No      Anticipated Discharge Dispo: Discharge Disposition: D/T to home under A care in anticipation of covered skilled care (06)  Discharge Address: [address] [place] [address] [address] [place] [address]    DME Needed: No    Action(s) Taken: Updated Provider/Nurse on Discharge Plan and DC Assessment Complete (See below)    Discussed discharge planning needs during rounds. Per MD, pt is not medically cleared at this time. PT/OT notes pending. Discussed SNF vs HH if pt is at baseline. Dr. Clement to place order.     Per chart review, pt is confused. RN CM called pt's son/DPOA, Wilfrido, discussed discharge planning. Per Wilfrido, pt lives in a mobile home with a 24/7 caregiver, Sheree Macdonald [phone]. Per Wilfrido, pt's mentation has declined, uses a wheelchair at home, Sheree assists with ADLs. Pt has a wheelchair and hospital bed. Pt does not use supplemental home oxygen. Pt's son reports pt had [facility]  previously. Per Wilfrido, discharge plan is home with home health with caregiver. He also requested for additional caregiver resources. Received  choice for [facility] , [facility] if pt qualifies. Choice form faxed to [facility]. Caregiver resources email to [email or web address] per request. RN CM called [facility]. Per Fawn, pt was discharged from  on September. BUBBA HARTMAN called Faith from [facility] from [facility]. Faith to call back after she reviews the chart.Ty, [facility] updated. Pt will need assistance with transportation at discharge.     Escalations Completed: None    Medically Clear: No    Next Steps:   Follow up with treatment team for medical clearance and discharge planning needs.    Barriers to Discharge: Medical clearance and Pending PT Evaluation    Is  the patient up for discharge tomorrow: No          Care Transition Team Assessment    Information Source  Orientation Level: Disoriented to place, Disoriented to time, Disoriented to situation, Oriented to person  Information Given By: Other (Comments) (pt's son)  Informant's Name: MINORPAL  Who is responsible for making decisions for patient? : POA  Name(s) of Primary Decision Maker: PAL GAXIOLA         Elopement Risk  Legal Hold: No  Ambulatory or Self Mobile in Wheelchair: No-Not an Elopement Risk  Elopement Risk: Not at Risk for Elopement    Interdisciplinary Discharge Planning  Primary Care Physician: MELANIE GARCIA P.A.-C.  Lives with - Patient's Self Care Capacity: Attendant / Paid Care Giver (Sheree Marvin)  Support Systems: Children, Other (Comments) (in home caregiver)  Housing / Facility: Mobile Home  Mobility Issues: Yes  Patient Prefers to be Discharged to:: Home  Assistance Needed: Unknown at this Time  Durable Medical Equipment: Other - Specify (wheelchair, hospital bed)    Discharge Preparedness  What is your plan after discharge?: Uncertain - pending medical team collaboration, Home with help, Home health care  What are your discharge supports?: Other (comment) (caregiver)  Prior Functional Level: Uses Wheelchair    Functional Assesment  Prior Functional Level: Uses Wheelchair    Finances  Financial Barriers to Discharge: No  Prescription Coverage: Yes         Advance Directive  Advance Directive?: DPOA for Health Care  Durable Power of  Name and Contact : PAL GAXIOLA ([phone]_    Domestic Abuse  Have you ever been the victim of abuse or violence?: No         Discharge Risks or Barriers  Discharge risks or barriers?: Complex medical needs  Patient risk factors: Complex medical needs, Vulnerable adult    Anticipated Discharge Information  Discharge Disposition: D/T to home under HHA care in anticipation of covered skilled care (06)  Discharge Address: [address] [place] [address] [address] [place]  [id number]

## 2023-11-16 NOTE — WOUND TEAM
Wound consult placed regarding left wrist dry scab and redness to groin. Chart and images reviewed. Pt dry scab is stable and should be left open to air. Pts groin is slightly red, recommend interdry cloth. No advanced wound care needs were identified. Wound consult completed.

## 2023-11-16 NOTE — PROGRESS NOTES
4 Eyes Skin Assessment Completed by BUBBA Hopkins and BUBBA Tavares.    Head WDL  Ears WDL  Nose WDL  Mouth WDL  Neck WDL  Breast/Chest WDL  Shoulder Blades WDL  Spine WDL  (R) Arm/Elbow/Hand Redness, Blanching, and Bruising  (L) Arm/Elbow/Hand Redness, Blanching, Bruising, and Scab  Abdomen WDL  Groin WDL  Scrotum/Coccyx/Buttocks Redness and Blanching  (R) Leg Bruising  (L) Leg Bruising  (R) Heel/Foot/Toe WDL  (L) Heel/Foot/Toe WDL          Devices In Places Tele Box, Blood Pressure Cuff, Pulse Ox, and Nasal Cannula      Interventions In Place Gray Ear Foams, Waffle Overlay, TAP System, Pillows, Q2 Turns, Heels Loaded W/Pillows, and Pressure Redistribution Mattress    Possible Skin Injury Yes    Pictures Uploaded Into Epic Yes  Wound Consult Placed Yes  RN Wound Prevention Protocol Ordered Yes

## 2023-11-16 NOTE — PROGRESS NOTES
Pt unable to maintain adequate arousal during PO med pass. Pt able to get Seroquel down with much prompting. Prilosec held, as med is not crushable and pt no longer able to follow commands. Speech consult ordered for speech evaluation, as this is a change from previous speech evaluation where pt was able to take sips of water and 90 mL of water effectively. On call hospitalist notified of pt change and diet change.

## 2023-11-16 NOTE — THERAPY
Physical Therapy   Initial Evaluation     Patient Name: Tracy Puente  Age:  82 y.o., Sex:  female  Medical Record #: [id number]  Today's Date: 11/16/2023     Precautions  Precautions: Fall Risk    Assessment  Patient is 82 y.o. female admitted with acute cystitis without hematuria, acute hypoxic respiratory failure, and acute pulmonary edema after caregiver noticed increased weakness at home.  PMH includes dementia (baseline A&O x 1), CKD stage 3b, primary HTN, & rheumatoid arthritis.  Pt was seen for PT evaluation, required mod A for bed mobility as documented below.  Per OT conversation with pt's son, pt seems to be at/near her functional baseline.  Pt would benefit from sitting EOB daily with nursing staff to prevent further deconditioning.  No further acute PT needs.    Plan    Physical Therapy Initial Treatment Plan   Duration: Evaluation only    DC Equipment Recommendations: None  Discharge Recommendations: (Pt appears to be at/near functional baseline.  Recommend return home with caregiver.  May benefit from home health if pt and/or family interested however is not a barrier to DC.)     Objective     11/16/23 1026   Precautions   Precautions Fall Risk   Pain 0 - 10 Group   Therapist Pain Assessment Post Activity Pain Same as Prior to Activity;Nurse Notified;0   Prior Living Situation   Prior Services Continuous (24 Hour) Care Giving Per Service   Housing / Facility 1 Story House   Lives with - Patient's Self Care Capacity Attendant / Paid Care Giver   Comments Per OT's conversation with pt's son: pt has a 24/7 caregiver who assists with all mobility & ADLs.  Pt has primarily been transferring to  with caregiver assistance for ~2 years until the last 2 weeks in which she has had increased weakness.   Prior Level of Functional Mobility   Bed Mobility Required Assist   Transfer Status Required Assist   Ambulation (Per son pt has not ambulated in 2 years)   Assistive Devices Used Wheelchair   Wheelchair Required  "Assist   Comments Pt confused, reported \"I just walk\".   Cognition    Cognition / Consciousness X   Comments Pleasantly confused, aware she is in the hospital and that she has a caregiver at home.   Active ROM Lower Body    Active ROM Lower Body  X   Comments Appears WFL for pt's baseline level of mobility, B knee extension lacking ~15-20*   Strength Lower Body   Comments Generalized weakness, not formally tested due to pt confusion & asking to return to bed   Balance Assessment   Sitting Balance (Static) Fair -   Sitting Balance (Dynamic) Fair -   Weight Shift Sitting Poor   Bed Mobility    Supine to Sit Moderate Assist   Sit to Supine Moderate Assist   Scooting Moderate Assist   Functional Mobility   Sit to Stand Unable to Participate   Activity Tolerance   Sitting Edge of Bed 5-7 min   Education Group   Education Provided Role of Physical Therapist   Role of Physical Therapist Patient Response Patient;Acceptance;Explanation;Reinforcement Needed;Verbal Demonstration   Physical Therapy Initial Treatment Plan    Duration Evaluation only     "

## 2023-11-16 NOTE — ASSESSMENT & PLAN NOTE
Strict ins and outs and daily weights  Monitor on telemetry  IV Lasix  Cardiac echo has been ordered

## 2023-11-16 NOTE — THERAPY
Occupational Therapy   Initial Evaluation     Patient Name: Tracy Puente  Age:  82 y.o., Sex:  female  Medical Record #: [id number]  Today's Date: 11/16/2023     Precautions: (P) Fall Risk    Assessment  Patient is 82 y.o. female admitted with UTI and acute respiratory failure with hx of dementia, recurrent UTI, CKD stage III, hypertension, rheumatoid arthritis seen for OT evaluation. Pt required mod A to EOB and mod/max A for self cares. Discussed PLOF with son (see below). Pt is near baseline level of function and has 24/7 support at home from caregiver. Son inquired about resources for more care giving support, provided handout. No further acute OT needs. Recommend daily up to EOB with hospital staff.     Patient will not be actively followed for occupational therapy services at this time, however may be seen if requested by physician for 1 more visit within 30 days to address any discharge or equipment needs.       Plan    Occupational Therapy Initial Treatment Plan   Duration: (P) Evaluation only    DC Equipment Recommendations: (P) None  Discharge Recommendations: (P) Recommend home health for continued occupational therapy services (if pt/family interested, not a barrier to discharge)        11/16/23 1031   Prior Living Situation   Housing / Facility [facility]   Lives with - Patient's Self Care Capacity Attendant / Paid Care Giver   Comments Discussed PLOF with pt's son via phone. Per son pt has 24/7 in home caregiver who assists with all ADLs/mobility. Pt has had increased weakness the last 2 weeks and has been primarily in bed. Prior pt was transferring to  with caregiver assist. Pt has not ambulated in 2 years   Prior Level of ADL Function   Self Feeding Requires Assist   Grooming / Hygiene Requires Assist   Bathing Requires Assist   Dressing Requires Assist   Toileting Requires Assist   Prior Level of IADL Function   Medication Management Dependent   Laundry Dependent   Kitchen Mobility Dependent    Finances Dependent   Home Management Dependent   Shopping Dependent   Precautions   Precautions Fall Risk   Cognition    Cognition / Consciousness X   Comments pleasantly confused, aware that she is in the hospital, able to state she has caregiving assist at home   Active ROM Upper Body   Active ROM Upper Body  WDL   Strength Upper Body   Upper Body Strength  WDL   Comments 5/5 UE strength bilaterally   Balance Assessment   Sitting Balance (Static) Fair -   Sitting Balance (Dynamic) Fair -   Weight Shift Sitting Poor   Bed Mobility    Supine to Sit Moderate Assist   Sit to Supine Moderate Assist   Scooting Moderate Assist   ADL Assessment   Grooming Contact Guard Assist;Seated   Lower Body Dressing Maximal Assist   Toileting Total Assist   How much help from another person does the patient currently need...   6 Clicks Daily Activity Score 11   Functional Mobility   Sit to Stand Unable to Participate   Patient / Family Goals   Patient / Family Goal #1 none stated   Education Group   Role of Occupational Therapist Patient Response Patient;Family;Acceptance;Explanation   Additional Comments provided pt with handout for additional caregiving support, at pt's son's request   Occupational Therapy Initial Treatment Plan    Duration Evaluation only   Anticipated Discharge Equipment and Recommendations   DC Equipment Recommendations None   Discharge Recommendations Recommend home health for continued occupational therapy services  (if pt/family interested, not a barrier to discharge)

## 2023-11-16 NOTE — ED NOTES
Pharmacy Medication Reconciliation      ~Medication reconciliation updated and complete per patient at bedside & patient home pharmacy   ~Allergies have been verified and updated   ~No oral ABX within the last 30 days  ~Patient home pharmacy :  [facility]      ~Anticoagulants (rivaroxaban, apixaban, edoxaban, dabigatran, warfarin, enoxaparin) taken in the last 14 days? No

## 2023-11-16 NOTE — CARE PLAN
The patient is Stable - Low risk of patient condition declining or worsening    Shift Goals  Clinical Goals: abx therapy, monitor BP  Patient Goals: Sleep  Family Goals: EMILY    Progress made toward(s) clinical / shift goals:    Problem: Knowledge Deficit - Standard  Goal: Patient and family/care givers will demonstrate understanding of plan of care, disease process/condition, diagnostic tests and medications  Description: Target End Date:  1-3 days or as soon as patient condition allows    Document in Patient Education    1.  Patient and family/caregiver oriented to unit, equipment, visitation policy and means for communicating concern  2.  Complete/review Learning Assessment  3.  Assess knowledge level of disease process/condition, treatment plan, diagnostic tests and medications  4.  Explain disease process/condition, treatment plan, diagnostic tests and medications  Outcome: Not Progressing  Note: Pt is A&O to self only. Reinforcement needed with education.       Patient is not progressing towards the following goals:      Problem: Knowledge Deficit - Standard  Goal: Patient and family/care givers will demonstrate understanding of plan of care, disease process/condition, diagnostic tests and medications  Description: Target End Date:  1-3 days or as soon as patient condition allows    Document in Patient Education    1.  Patient and family/caregiver oriented to unit, equipment, visitation policy and means for communicating concern  2.  Complete/review Learning Assessment  3.  Assess knowledge level of disease process/condition, treatment plan, diagnostic tests and medications  4.  Explain disease process/condition, treatment plan, diagnostic tests and medications  Outcome: Not Progressing  Note: Pt is A&O to self only. Reinforcement needed with education.

## 2023-11-16 NOTE — ASSESSMENT & PLAN NOTE
BP was very high 195/106.  A/w sob and hypoxia    I started losartan 50 mg daily  IV as needed medications

## 2023-11-16 NOTE — ASSESSMENT & PLAN NOTE
Desaturated on room air, required 3 L NC  proBNP 9043    Echo pending  Continue IV Lasix, monitor renal function

## 2023-11-16 NOTE — DISCHARGE PLANNING
[facility] TCN chart review completed. Noted in chart patient is A/O x 1-2.  TCN called POA, patient's son, Wilfrido, no response.  Will attempt again later.     Per chart review patient has care giving at baseline, unsure of amount.  Per MD note patient is on RA at baseline, currently on 2L NC.   No mobility documented in chart.  Awaiting PT/OT recommendations, SLP recommending no needs.      Update  Per CM, able to reach patient's son, reports d/c plan pending PT/OT, but okay with discharge home with home health with caregiver support if at baseline.

## 2023-11-16 NOTE — PROGRESS NOTES
Report received from BUBBA Yao. Pt transported onto unit. Call light and belongings within reach. SR 80s on the monitor. A&Ox1 to self only.

## 2023-11-16 NOTE — PROGRESS NOTES
Hospital Medicine Daily Progress Note    Date of Service  11/16/2023    Chief Complaint  Tracy Puente is a 82 y.o. female admitted 11/15/2023 with UTI and acute respiratory failure    Hospital Course  Tracy Puente is a 82 y.o. female who presented 11/15/2023 with past medical history of dementia, recurrent UTI, CKD stage III, hypertension, rheumatoid arthritis, who presents to the hospital for generalized weakness and hypoxia.  Patient is baseline confused.  Patient states for the past 3 days she has been coughing and difficulty urinating.  She denies any nausea, vomiting, diarrhea, fevers.      Interval Problem Update  Patient was seen and examined at bedside  Aox2, not knowing the year and the event, likely baseline  She has caregivers at home  She denies nausea vomiting or pain    On 2 L NC, not on home oxygen  Echo pending  Continue IV Lasix, monitor renal function    Urine culture pending, continue Rocephin    I have discussed this patient's plan of care and discharge plan at IDT rounds today with Case Management, Nursing, Nursing leadership, and other members of the IDT team.    Consultants/Specialty      Code Status  Full Code    Disposition  The patient is not medically cleared for discharge to home or a post-acute facility.      I have placed the appropriate orders for post-discharge needs.    Review of Systems  All 12 systems were reviewed and negative except as mentioned above       Physical Exam  Temp:  [36.4 °C (97.6 °F)-37.6 °C (99.7 °F)] 36.4 °C (97.6 °F)  Pulse:  [] 77  Resp:  [16-30] 16  BP: (150-195)/() 160/75  SpO2:  [90 %-97 %] 96 %    Physical Exam  Constitutional:       Appearance: She is ill-appearing.   HENT:      Head: Normocephalic.      Nose: Nose normal.      Mouth/Throat:      Mouth: Mucous membranes are moist.   Eyes:      Extraocular Movements: Extraocular movements intact.      Conjunctiva/sclera: Conjunctivae normal.   Cardiovascular:      Rate and Rhythm: Normal rate and  regular rhythm.      Pulses: Normal pulses.      Heart sounds: Normal heart sounds.   Pulmonary:      Breath sounds: No wheezing or rales.      Comments: Decrease the breathing sounds  Abdominal:      General: Bowel sounds are normal.      Palpations: Abdomen is soft.      Tenderness: There is no abdominal tenderness. There is no guarding.   Musculoskeletal:      Cervical back: Normal range of motion and neck supple.      Right lower leg: Edema present.      Left lower leg: Edema present.   Skin:     General: Skin is warm.   Neurological:      Mental Status: She is alert. Mental status is at baseline. She is disoriented.   Psychiatric:         Mood and Affect: Mood normal.         Fluids    Intake/Output Summary (Last 24 hours) at 11/16/2023 1100  Last data filed at 11/16/2023 0602  Gross per 24 hour   Intake --   Output 450 ml   Net -450 ml       Laboratory  Recent Labs     11/15/23  1456 11/16/23  0126   WBC 7.0 5.5   RBC 5.00 4.41   HEMOGLOBIN 14.2 12.5   HEMATOCRIT 42.7 38.2   MCV 85.4 86.6   MCH 28.4 28.3   MCHC 33.3 32.7   RDW 44.7 46.1   PLATELETCT 253 229   MPV 10.2 10.2     Recent Labs     11/15/23  1456 11/16/23  0126   SODIUM 141 142   POTASSIUM 3.8 3.8   CHLORIDE 106 107   CO2 22 22   GLUCOSE 107* 96   BUN 16 21   CREATININE 1.16 1.15   CALCIUM 9.5 8.7                   Imaging  DX-CHEST-PORTABLE (1 VIEW)   Final Result      No acute cardiac or pulmonary abnormalities are identified.      EC-ECHOCARDIOGRAM COMPLETE W/O CONT    (Results Pending)        Assessment/Plan  * Acute respiratory failure with hypoxia (HCC)  Assessment & Plan  Desaturated on room air, required 3 L NC  proBNP 9043    Echo pending  Continue IV Lasix, monitor renal function    Acute pulmonary edema (HCC)  Assessment & Plan  Strict ins and outs and daily weights  Monitor on telemetry  IV Lasix  Cardiac echo has been ordered    Acute cystitis without hematuria- (present on admission)  Assessment & Plan  UA + for bacteria, LE and  nitrite    Urine culture pending  Continue Rocephin    History of rheumatoid arthritis- (present on admission)  Assessment & Plan  Not on a DMARD    Debility- (present on admission)  Assessment & Plan  PT OT eval    Stage 3b chronic kidney disease (HCC)- (present on admission)  Assessment & Plan  Monitor BMP and assess response  Avoid IV contrast/nephrotoxins/NSAIDs  Dose adjust meds for decreased GFR      Elevated troponin- (present on admission)  Assessment & Plan  Secondary to renal failure  Continue to trend    Primary hypertension- (present on admission)  Assessment & Plan  Uncontrolled, no home medication    I started losartan 50 mg daily  IV as needed medications         VTE prophylaxis: Xarelto    I have performed a physical exam and reviewed and updated ROS and Plan today (11/16/2023). In review of yesterday's note (11/15/2023), there are no changes except as documented above.    I spent greater than 52 minutes for chart review, obtaining history independently, performing medically appropriate examination,  documenting , ordering medications, tests, or procedures, referring and communicating with other health care professionals, Independently interpreting results and communicating results with patient/family/caregiver. More than 50% of time was spent in face-to-face clinical encounter.

## 2023-11-16 NOTE — THERAPY
Speech Language Pathology   Clinical Swallow Evaluation     Patient Name: Tracy Puente  AGE:  82 y.o., SEX:  female  Medical Record #: [id number]  Date of Service: 11/16/2023      History of Present Illness  82 y.o. female who presented on 11/15 with generalized weakness and hypoxia.    CMHx: acute cystitis without hematuria, acute pulmonary edema, acute resp failure with hypoxia    PMHx: dementia, recurrent UTI, CKD stage III, HTN, rheumatoid arthritis    CXR:   No acute cardiac or pulmonary abnormalities are identified.      General Information:  Vitals  O2 (LPM): 2  O2 Delivery Device: Silicone Nasal Cannula  Level of Consciousness: Alert, Awake  Orientation: Self  Follows Directives: Yes      Prior Living Situation & Level of Function:  Lives with - Patient's Self Care Capacity: Attendant / Paid Care Giver  Communication: WFL  Swallowing: WFL       Oral Mechanism Evaluation:  Dentition: Good   Facial Symmetry: Equal  Facial Sensation: Equal     Labial Observations: WFL   Lingual Observations: Midline  Motor Speech: WFL            Laryngeal Function:  Secretion Management: Adequate  Voice Quality: WFL  Cough: Perceptually WNL         Subjective  RN cleared patient for clinical swallow evaluation. Pt asleep upon arrival, easily arousable. Denied any history of dysphagia. Endorsed having a caregiver at home; however, is able to feed self without difficulty. Per EMR, pt unable to maintain adequate arousal during PO med pass this AM. Agreeable to PO trials.        Assessment  Current Method of Nutrition: NPO until cleared by speech pathology  Positioning: Pérez's (60-90 degrees)  Bolus Administration: Patient  O2 (LPM): 2 O2 Delivery Device: Silicone Nasal Cannula  Factor(s) Affecting Performance: None  Tracheostomy : No      Swallowing Trials:  Swallowing Trials  Thin Liquid (TN0): WFL  Pureed (PU4): WFL  Soft & Bite Sized (SB6): WFL      Comments: Pt able to self-feed with appropriate rate and volume of intake.  "Adequate oral bolus acceptance. Mild anterior loss of left oral cavity with puree x2; otherwise functional oral bolus containment with remaining trials. Presumed complete AP transfer with no oral bolus residue appreciated. Slightly prolonged but complete mastication of soft&bite sized solids, suspect 2/2 lethargy and advanced age. No cough or throat clear appreciated across all consistencies. Vocal quality remained stable throughout oral intake. One-two swallows completed per bolus. Pt denied any globus sensation. Politely declined trials of regular solids d/t not liking the taste.       Clinical Impressions  Patient presents without overt s/sx of oropharyngeal dysphagia this date. No overt s/sx of aspiration throughout oral intake. Recommend initiation of modified diet of soft&bite sized solids and thin liquids given reports of lethargy with oral intake this date and prolonged mastication appreciated. Service to follow to ensure diet tolerance and advance as pt is appropriate.      Recommendations  Diet Consistency: Soft&bite sized solids/thin liquids  Instrumentation: None indicated at this time  Medication: Crush with applesauce, as appropriate, Crush with pudding/puree, as appropriate  Supervision: Assist with meal tray set up, Close supervision - patient may be left alone for less than 5 minutes at a time  Positioning: Fully upright and midline during oral intake  Risk Management : Slow rate of intake, Physical mobility, as tolerated  Oral Care: BID         SLP Treatment Plan  Treatment Plan: Dysphagia Treatment  SLP Frequency: 2x Per Week  Estimated Duration: Until Therapy Goals Met      Anticipated Discharge Needs  Discharge Recommendations: Anticipate that the patient will have no further speech therapy needs after discharge from the hospital   Therapy Recommendations Upon DC: Not Indicated        Patient / Family Goals  Patient / Family Goal #1: \"Pudding\"  Short Term Goals  Short Term Goal # 1: Pt will " consume a diet of soft&bite sized solids and thin liquids with no overt s/sx of aspiration or decline in respiratory status      Chao Hendrix, SLP

## 2023-11-17 ENCOUNTER — PHARMACY VISIT (OUTPATIENT)
Dept: PHARMACY | Facility: MEDICAL CENTER | Age: 83
End: 2023-11-17
Payer: COMMERCIAL

## 2023-11-17 ENCOUNTER — PATIENT OUTREACH (OUTPATIENT)
Dept: SCHEDULING | Facility: IMAGING CENTER | Age: 83
End: 2023-11-17
Payer: MEDICARE

## 2023-11-17 VITALS
HEIGHT: 62 IN | SYSTOLIC BLOOD PRESSURE: 141 MMHG | BODY MASS INDEX: 23.98 KG/M2 | WEIGHT: 130.29 LBS | OXYGEN SATURATION: 94 % | TEMPERATURE: 97.8 F | HEART RATE: 87 BPM | RESPIRATION RATE: 16 BRPM | DIASTOLIC BLOOD PRESSURE: 77 MMHG

## 2023-11-17 LAB
ANION GAP SERPL CALC-SCNC: 14 MMOL/L (ref 7–16)
BACTERIA UR CULT: ABNORMAL
BUN SERPL-MCNC: 30 MG/DL (ref 8–22)
CALCIUM SERPL-MCNC: 8.7 MG/DL (ref 8.5–10.5)
CHLORIDE SERPL-SCNC: 107 MMOL/L (ref 96–112)
CO2 SERPL-SCNC: 20 MMOL/L (ref 20–33)
CREAT SERPL-MCNC: 1.08 MG/DL (ref 0.5–1.4)
ERYTHROCYTE [DISTWIDTH] IN BLOOD BY AUTOMATED COUNT: 46.1 FL (ref 35.9–50)
GFR SERPLBLD CREATININE-BSD FMLA CKD-EPI: 50 ML/MIN/1.73 M 2
GLUCOSE SERPL-MCNC: 92 MG/DL (ref 65–99)
HCT VFR BLD AUTO: 40.3 % (ref 37–47)
HGB BLD-MCNC: 13 G/DL (ref 12–16)
MAGNESIUM SERPL-MCNC: 2 MG/DL (ref 1.5–2.5)
MCH RBC QN AUTO: 28.1 PG (ref 27–33)
MCHC RBC AUTO-ENTMCNC: 32.3 G/DL (ref 32.2–35.5)
MCV RBC AUTO: 87.2 FL (ref 81.4–97.8)
PHOSPHATE SERPL-MCNC: 3.9 MG/DL (ref 2.5–4.5)
PLATELET # BLD AUTO: 222 K/UL (ref 164–446)
PMV BLD AUTO: 10.5 FL (ref 9–12.9)
POTASSIUM SERPL-SCNC: 3.5 MMOL/L (ref 3.6–5.5)
RBC # BLD AUTO: 4.62 M/UL (ref 4.2–5.4)
SIGNIFICANT IND 70042: ABNORMAL
SITE SITE: ABNORMAL
SODIUM SERPL-SCNC: 141 MMOL/L (ref 135–145)
SOURCE SOURCE: ABNORMAL
WBC # BLD AUTO: 5 K/UL (ref 4.8–10.8)

## 2023-11-17 PROCEDURE — 84100 ASSAY OF PHOSPHORUS: CPT

## 2023-11-17 PROCEDURE — 700102 HCHG RX REV CODE 250 W/ 637 OVERRIDE(OP): Performed by: STUDENT IN AN ORGANIZED HEALTH CARE EDUCATION/TRAINING PROGRAM

## 2023-11-17 PROCEDURE — 36415 COLL VENOUS BLD VENIPUNCTURE: CPT

## 2023-11-17 PROCEDURE — 700102 HCHG RX REV CODE 250 W/ 637 OVERRIDE(OP): Performed by: HOSPITALIST

## 2023-11-17 PROCEDURE — 700111 HCHG RX REV CODE 636 W/ 250 OVERRIDE (IP): Mod: JZ | Performed by: HOSPITALIST

## 2023-11-17 PROCEDURE — 80048 BASIC METABOLIC PNL TOTAL CA: CPT

## 2023-11-17 PROCEDURE — 700111 HCHG RX REV CODE 636 W/ 250 OVERRIDE (IP): Performed by: STUDENT IN AN ORGANIZED HEALTH CARE EDUCATION/TRAINING PROGRAM

## 2023-11-17 PROCEDURE — A9270 NON-COVERED ITEM OR SERVICE: HCPCS | Performed by: HOSPITALIST

## 2023-11-17 PROCEDURE — 90471 IMMUNIZATION ADMIN: CPT

## 2023-11-17 PROCEDURE — 90662 IIV NO PRSV INCREASED AG IM: CPT | Performed by: STUDENT IN AN ORGANIZED HEALTH CARE EDUCATION/TRAINING PROGRAM

## 2023-11-17 PROCEDURE — 97602 WOUND(S) CARE NON-SELECTIVE: CPT

## 2023-11-17 PROCEDURE — 3E02340 INTRODUCTION OF INFLUENZA VACCINE INTO MUSCLE, PERCUTANEOUS APPROACH: ICD-10-PCS | Performed by: STUDENT IN AN ORGANIZED HEALTH CARE EDUCATION/TRAINING PROGRAM

## 2023-11-17 PROCEDURE — 99239 HOSP IP/OBS DSCHRG MGMT >30: CPT | Performed by: STUDENT IN AN ORGANIZED HEALTH CARE EDUCATION/TRAINING PROGRAM

## 2023-11-17 PROCEDURE — RXMED WILLOW AMBULATORY MEDICATION CHARGE: Performed by: STUDENT IN AN ORGANIZED HEALTH CARE EDUCATION/TRAINING PROGRAM

## 2023-11-17 PROCEDURE — 83735 ASSAY OF MAGNESIUM: CPT

## 2023-11-17 PROCEDURE — A9270 NON-COVERED ITEM OR SERVICE: HCPCS | Performed by: STUDENT IN AN ORGANIZED HEALTH CARE EDUCATION/TRAINING PROGRAM

## 2023-11-17 PROCEDURE — 85027 COMPLETE CBC AUTOMATED: CPT

## 2023-11-17 RX ORDER — CEPHALEXIN 500 MG/1
500 CAPSULE ORAL 4 TIMES DAILY
Qty: 8 CAPSULE | Refills: 0 | Status: SHIPPED | OUTPATIENT
Start: 2023-11-18 | End: 2023-11-17 | Stop reason: SDUPTHER

## 2023-11-17 RX ORDER — CEPHALEXIN 500 MG/1
500 CAPSULE ORAL 2 TIMES DAILY
Qty: 4 CAPSULE | Refills: 0 | Status: ACTIVE | OUTPATIENT
Start: 2023-11-18 | End: 2023-11-20

## 2023-11-17 RX ORDER — LOSARTAN POTASSIUM 50 MG/1
50 TABLET ORAL DAILY
Qty: 30 TABLET | Refills: 0 | Status: SHIPPED | OUTPATIENT
Start: 2023-11-17 | End: 2023-12-20 | Stop reason: SDUPTHER

## 2023-11-17 RX ORDER — POTASSIUM CHLORIDE 20 MEQ/1
40 TABLET, EXTENDED RELEASE ORAL ONCE
Status: COMPLETED | OUTPATIENT
Start: 2023-11-17 | End: 2023-11-17

## 2023-11-17 RX ORDER — CEPHALEXIN 500 MG/1
500 CAPSULE ORAL 4 TIMES DAILY
Qty: 20 CAPSULE | Refills: 0 | Status: ACTIVE | OUTPATIENT
Start: 2023-11-17 | End: 2023-11-17 | Stop reason: SDUPTHER

## 2023-11-17 RX ADMIN — CEFTRIAXONE SODIUM 1000 MG: 10 INJECTION, POWDER, FOR SOLUTION INTRAVENOUS at 06:44

## 2023-11-17 RX ADMIN — FUROSEMIDE 20 MG: 10 INJECTION, SOLUTION INTRAVENOUS at 04:09

## 2023-11-17 RX ADMIN — LOSARTAN POTASSIUM 50 MG: 50 TABLET, FILM COATED ORAL at 05:34

## 2023-11-17 RX ADMIN — HALOPERIDOL LACTATE 1 MG: 5 INJECTION, SOLUTION INTRAMUSCULAR at 12:56

## 2023-11-17 RX ADMIN — HALOPERIDOL LACTATE 1 MG: 5 INJECTION, SOLUTION INTRAMUSCULAR at 04:08

## 2023-11-17 RX ADMIN — QUETIAPINE FUMARATE 25 MG: 25 TABLET ORAL at 05:35

## 2023-11-17 RX ADMIN — POTASSIUM CHLORIDE 40 MEQ: 1500 TABLET, EXTENDED RELEASE ORAL at 09:09

## 2023-11-17 RX ADMIN — INFLUENZA A VIRUS A/VICTORIA/4897/2022 IVR-238 (H1N1) ANTIGEN (FORMALDEHYDE INACTIVATED), INFLUENZA A VIRUS A/DARWIN/9/2021 SAN-010 (H3N2) ANTIGEN (FORMALDEHYDE INACTIVATED), INFLUENZA B VIRUS B/PHUKET/3073/2013 ANTIGEN (FORMALDEHYDE INACTIVATED), AND INFLUENZA B VIRUS B/MICHIGAN/01/2021 ANTIGEN (FORMALDEHYDE INACTIVATED) 0.7 ML: 60; 60; 60; 60 INJECTION, SUSPENSION INTRAMUSCULAR at 12:46

## 2023-11-17 ASSESSMENT — PAIN DESCRIPTION - PAIN TYPE: TYPE: ACUTE PAIN

## 2023-11-17 ASSESSMENT — FIBROSIS 4 INDEX: FIB4 SCORE: 1.74

## 2023-11-17 NOTE — DISCHARGE PLANNING
DC Transport Scheduled    Received request at: 11/17/2023 at 1115    Transport Company Scheduled:  [employer]    Scheduled Date: 11/17/2023  Scheduled Time: 1700    Destination: Home at [address] [place]     Notified care team of scheduled transport via Voalte.     If there are any changes needed to the DC transportation scheduled, please contact [facility] at ext. [phone] between the hours of 9892-4910 Mon-Fri. If outside those hours, contact the ED Case Manager at ext. [phone].

## 2023-11-17 NOTE — PROGRESS NOTES
Assumed care of patient, bedside report received from [facility] day shift RN. Updated on plan of care, call light within reach and fall precautions are in place and bed lock and in lowest position. Patient instructed to call for assistance before getting out of bed. All questions answered at this time. No other needs.

## 2023-11-17 NOTE — DISCHARGE PLANNING
HTH/SCP TCN chart review completed. Collaborated with DEVAN Hassan. Current discharge considerations noted to home with home healthcare noting patient acceptance to [facility]  11/16/2023.     Given patient current documented confusion, TCN placed telephone call to patient son Wilfrido. Provided education and discussion regarding current post acute discharge considerations to home healthcare. Wilfrido stated understanding and verbalized agreement to home healthcare services. Son advised that patient is followed by [facility] in the home and Wilfrido provided consent for TCN to send referral to [facility] to notify of current hospitalization and anticipated discharge. Og requested meds to beds at time of discharge.     In chart review and collaboration with CM, noted patient an anticipated discharge today. TCN will continue to follow and collaborate with discharge planning team as additional post acute needs arise. Thank you.    Completed  PT with recommendations for patient appears to be at/near functional baseline.  Recommend return home with caregiver. May benefit from home health if pt and/or family interested however is not a barrier to DC  on 11/16/2023  OT recommended home health for continued occupational therapy services (if pt/family interested, not a barrier to discharge) on 11/17/2023  Seen by SLP 11/16/2023 please see SLP note for current diet texture recommednations  Choice obtained by CM: [facility]. Noted patient acceptance to [facility] 11/16/2023  [facility] is patient primary care provider. Referral sent 11/17/2023

## 2023-11-17 NOTE — WOUND TEAM
[facility]  Inpatient Services  Wound and Skin Care Brief Evaluation    Admission Date: 11/15/2023     Last order of IP CONSULT TO WOUND CARE was found on 11/17/2023 from Hospital Encounter on 11/15/2023     HPI, PMH, SH: Reviewed    Chief Complaint   Patient presents with    Weakness     BIB EMS from home for increased weakness x1 day. Pt lives with friends and per EMS they noticed she was more weak this morning than normal. Pt is baseline A&Ox1. -stroke assessment.      Diagnosis: Acute cystitis without hematuria [N30.00]    Unit where seen by Wound Team: T833/01     Wound consult placed regarding sacrum. Chart and images reviewed. This discussed with bedside BUBBA Lainez. This clinician in to assess patient. Patient confused and agreeable. Light purple non-blanching discoloration to coccyx - POA DTI. Non-selectively debrided with No rinse foam soap and Moist warm washcloth.     No advanced wound care needs identified. Wound consult completed. No further follow up unless indicated and consulted.     Wound 11/17/23 Coccyx (Active)   Date First Assessed/Time First Assessed: 11/17/23 0206   Present on Original Admission: No  Hand Hygiene Completed: Yes  Location: Coccyx      Assessments 11/17/2023 12:00 PM   Site Assessment Dry;Intact;Light Purple;Red   Periwound Assessment Non-blanchable erythema;Dry;Intact   Margins Attached edges   Closure Secondary intention   Drainage Amount None   Treatments Cleansed;Nonselective debridement;Site care;Offloading   Wound Cleansing Foam Cleanser/Washcloth   Periwound Protectant Barrier Paste   Dressing Status Open to Air   NEXT Weekly Photo (Inpatient Only) 11/22/23   Wound Team Following Not following   Wound Length (cm) 0.5 cm   Wound Width (cm) 0.5 cm   Wound Surface Area (cm^2) 0.25 cm^2   Wound Bed Epithelium (%) 100 %   Shape round   Wound Odor None       PREVENTATIVE INTERVENTIONS:    Q shift David - performed per nursing policy  Q shift pressure point  assessments - performed per nursing policy    Skin care orders in place for bedside nursing. Waffle overlay and TAPS in place.

## 2023-11-17 NOTE — CARE PLAN
Problem: Knowledge Deficit - Standard  Goal: Patient and family/care givers will demonstrate understanding of plan of care, disease process/condition, diagnostic tests and medications  Outcome: Not Progressing  Note: Patient is axo x1. Patient needs reorienting.      Problem: Fall Risk  Goal: Patient will remain free from falls  Outcome: Progressing  Note: Patient is a high fall risk. Precautions on in place.      Problem: Skin Integrity  Goal: Skin integrity is maintained or improved  Outcome: Progressing   The patient is Stable - Low risk of patient condition declining or worsening    Shift Goals  Clinical Goals: monitor vitals, safety  Patient Goals: sleep  Family Goals: n/a    Progress made toward(s) clinical / shift goals:      Patient is not progressing towards the following goals:      Problem: Knowledge Deficit - Standard  Goal: Patient and family/care givers will demonstrate understanding of plan of care, disease process/condition, diagnostic tests and medications  Outcome: Not Progressing  Note: Patient is axo x1. Patient needs reorienting.

## 2023-11-17 NOTE — DISCHARGE PLANNING
Case Management Discharge Planning    Admission Date: 11/15/2023  GMLOS: 3.9  ALOS: 2    Anticipated Discharge Dispo: Discharge Disposition: D/T to home under HHA care in anticipation of covered skilled care (06)  Discharge Address: [address] [place] [address] [address] [place] [address]    DME Needed: No    Action(s) Taken:     Spoke with pt's son over the phone to discuss discharge. Verbally went over IMM.   Wilfrido states pt lives with care taker, Sheree. Pt is wc bound and will need transportation home.     Called Sheree to notify of d/c today. Sheree states she's currently in [place], [place] but will leave now and arrive within 3-4 hours. She states she should be at the home by 1700.     Transportation form completed and faxed to [employer] Rivas Completed: None    Medically Clear: Yes    Next Steps: D/C home with     Barriers to Discharge: None    Is the patient up for discharge tomorrow: No    1700- Spoke with Care taker, Sheree and confirmed she's now home

## 2023-11-17 NOTE — DISCHARGE SUMMARY
Discharge Summary    CHIEF COMPLAINT ON ADMISSION  Chief Complaint   Patient presents with    Weakness     BIB EMS from home for increased weakness x1 day. Pt lives with friends and per EMS they noticed she was more weak this morning than normal. Pt is baseline A&Ox1. -stroke assessment.        Reason for Admission  ems     Admission Date  11/15/2023    CODE STATUS  Full Code    HPI & HOSPITAL COURSE  Tracy Puente is a 82 y.o. female who presented 11/15/2023 with past medical history of dementia, recurrent UTI, CKD stage III, hypertension, rheumatoid arthritis, who admitted to the hospital with UTI and acute respiratory failure. She has been treated with rocephin and transitioned to keflex. Her urinary symptoms resolved.   She was found hypoxia and required 2L NC at admission. Her BP was very high 195/106. Echo was unremarkable with norrnal EF50%. Considered flush pulmonary edema due to hypertensive urgency. She was treated with iv lasix and Started on losartan 50mg. Her BP significantly improved. She weaned off oxygen.     PT OT recommended home health. patient is at her baseline, she has a caregiver at home.     Therefore, she is discharged in good and stable condition to home with organized home healthcare and close outpatient follow-up.    The patient met 2-midnight criteria for an inpatient stay at the time of discharge.    Discharge Date      FOLLOW UP ITEMS POST DISCHARGE  - Follow up with primary care physician in 1 week.     - your blood pressure is high. We started losartan.  Please follow up with your primary care physician for further management.   - Please take the medications as instructed    - Go to the local Emergency Department if you have any worsening condition.       DISCHARGE DIAGNOSES  Principal Problem:    Acute respiratory failure with hypoxia (HCC) (POA: Unknown)  Active Problems:    Hypertensive urgency (POA: Yes)      Overview: This is a chronic, well-controlled condition.  She is  currently taking       carvedilol 12.5 mg twice daily.  In the past she has had amlodipine per       chart review, but patient is not taking this right now.  No recent labs.    Elevated troponin (POA: Yes)    Stage 3b chronic kidney disease (HCC) (POA: Yes)    Debility (POA: Yes)    History of rheumatoid arthritis (POA: Yes)    Acute cystitis without hematuria (POA: Yes)    Acute pulmonary edema (HCC) (POA: Unknown)  Resolved Problems:    * No resolved hospital problems. *      FOLLOW UP  No future appointments.  Kaity Guy P.A.-C.  [address] [facility] [address]  [phone]    Follow up on 12/5/2023  Kaity Guy P.A.-C. will be out to see you on Tuesday December 5th 2023      MEDICATIONS ON DISCHARGE     Medication List        START taking these medications        Instructions   cephALEXin 500 MG Caps  Start taking on: November 18, 2023  Commonly known as: Keflex   Take 1 Capsule by mouth 2 times a day for 2 days.  Dose: 500 mg     losartan 50 MG Tabs  Commonly known as: Cozaar   Take 1 Tablet by mouth every day for 30 days.  Dose: 50 mg            CONTINUE taking these medications        Instructions   celecoxib 100 MG Caps  Commonly known as: CeleBREX   Take 1 Capsule by mouth every morning.  Dose: 100 mg     hydrocortisone 2.5 % Crea topical cream   Doctor's comments: Apply small amount topically to hemorrhoid twice daily until resolved  Apply 1 Application topically 2 times a day as needed (hemorrhoid).  Dose: 1 Application     mirtazapine 7.5 MG tablet  Commonly known as: Remeron   Take 1 Tablet by mouth every evening. Indications: Memory  Dose: 7.5 mg     ondansetron 8 MG Tabs  Commonly known as: Zofran   Take 1 Tablet by mouth every 8 hours as needed for Nausea/Vomiting. Indications: Nausea  Dose: 8 mg     pantoprazole 20 MG tablet  Commonly known as: Protonix   Take 1 Tablet by mouth every morning. Indications: Heartburn  Dose: 20 mg     QUEtiapine 25 MG Tabs  Commonly known as: SEROquel   Take 1  Tablet by mouth 2 times a day.  Dose: 25 mg     vitamin D2 (Ergocalciferol) 1.25 MG (18712 UT) Caps capsule  Commonly known as: Drisdol   Take 1 Capsule by mouth every 7 days. Indications: Vitamin D Deficiency  Dose: 50,000 Units              Allergies  Allergies   Allergen Reactions    Sulfamethoxazole W-Trimethoprim Itching       DIET  Orders Placed This Encounter   Procedures    Diet Order Diet: Level 6 - Soft and Bite Sized; Liquid level: Level 0 - Thin; Tray Modifications (optional): SLP - Deliver to Nursing Station     Standing Status:   Standing     Number of Occurrences:   1     Order Specific Question:   Diet:     Answer:   Level 6 - Soft and Bite Sized [23]     Order Specific Question:   Liquid level     Answer:   Level 0 - Thin     Order Specific Question:   Tray Modifications (optional)     Answer:   SLP - Deliver to Nursing Station       ACTIVITY  As tolerated.  Weight bearing as tolerated    CONSULTATIONS      PROCEDURES      LABORATORY  Lab Results   Component Value Date    SODIUM 141 11/17/2023    POTASSIUM 3.5 (L) 11/17/2023    CHLORIDE 107 11/17/2023    CO2 20 11/17/2023    GLUCOSE 92 11/17/2023    BUN 30 (H) 11/17/2023    CREATININE 1.08 11/17/2023        Lab Results   Component Value Date    WBC 5.0 11/17/2023    HEMOGLOBIN 13.0 11/17/2023    HEMATOCRIT 40.3 11/17/2023    PLATELETCT 222 11/17/2023        Total time of the discharge process exceeds 32 minutes.

## 2023-11-17 NOTE — DISCHARGE PLANNING
ATTN: Case Management  RE: Referral for Home Health    As of 11-16-23, we have accepted the Home Health referral for the patient listed above.    A [facility] clinician will be out to see the patient within 48 hours. If you have any questions or concerns regarding the patient's transition to Home Health, please do not hesitate to contact us at [phone].      We look forward to collaborating with you,  [facility] Home Health Team

## 2023-11-19 LAB — EKG IMPRESSION: NORMAL

## 2023-11-21 ENCOUNTER — HOME CARE VISIT (OUTPATIENT)
Dept: HOME HEALTH SERVICES | Facility: HOME HEALTHCARE | Age: 83
End: 2023-11-21

## 2023-11-25 ENCOUNTER — HOME CARE VISIT (OUTPATIENT)
Dept: HOME HEALTH SERVICES | Facility: HOME HEALTHCARE | Age: 83
End: 2023-11-25

## 2023-11-25 NOTE — Clinical Note
FYI    Patient contacted on call services to have nurse scheduled for visit.  RN let on call  know that that call should go to the administrative team to set up a date.  They will call admin on call.

## 2023-11-26 ENCOUNTER — HOME CARE VISIT (OUTPATIENT)
Dept: HOME HEALTH SERVICES | Facility: HOME HEALTHCARE | Age: 83
End: 2023-11-26
Payer: MEDICARE

## 2023-11-26 VITALS
HEIGHT: 62 IN | HEART RATE: 79 BPM | BODY MASS INDEX: 23.92 KG/M2 | DIASTOLIC BLOOD PRESSURE: 60 MMHG | OXYGEN SATURATION: 94 % | RESPIRATION RATE: 16 BRPM | TEMPERATURE: 98.3 F | WEIGHT: 130 LBS | SYSTOLIC BLOOD PRESSURE: 110 MMHG

## 2023-11-26 PROCEDURE — 665001 SOC-HOME HEALTH

## 2023-11-26 PROCEDURE — G0493 RN CARE EA 15 MIN HH/HOSPICE: HCPCS

## 2023-11-26 ASSESSMENT — ENCOUNTER SYMPTOMS
DEBILITATING PAIN: 1
POOR JUDGMENT: 1
DIFFICULTY THINKING: 1
DEPRESSED MOOD: 1

## 2023-11-26 ASSESSMENT — FIBROSIS 4 INDEX: FIB4 SCORE: 1.74

## 2023-11-26 NOTE — Clinical Note
Primary DX/skilled need: Exacerbation of chronic disease, Physical Debility  SN to observe,assess,teach,train and monitor medications and disease conditions.  SN Frequnecies:2w2,1w4,3 PRN  Zip Code:[address]  Disciplines ordered:PT,SN,HHA,MSW (for eval for possiblity more help for CG or group home; Team  to evaluate living arrangement  Insurance Authorizartion:[facility]  Certification Period:11/26/23-1/24/24  Special Considerations: CG Sheree expresses some burnout for her as she is unable to leave pt alone anymore.Fell out of her bed recently

## 2023-11-27 ENCOUNTER — HOME CARE VISIT (OUTPATIENT)
Dept: HOME HEALTH SERVICES | Facility: HOME HEALTHCARE | Age: 83
End: 2023-11-27
Payer: MEDICARE

## 2023-11-27 ENCOUNTER — DOCUMENTATION (OUTPATIENT)
Dept: MEDICAL GROUP | Facility: PHYSICIAN GROUP | Age: 83
End: 2023-11-27
Payer: MEDICARE

## 2023-11-27 ASSESSMENT — ENCOUNTER SYMPTOMS
PAIN LOCATION - PAIN FREQUENCY: WITH ACTIVITY
NAUSEA: DENIES
FATIGUES EASILY: 1
PAIN LOCATION - PAIN QUALITY: ACHE
LAST BOWEL MOVEMENT: 66802
HIGHEST PAIN SEVERITY IN PAST 24 HOURS: 3/10
VOMITING: DENIES
BOWEL PATTERN NORMAL: 1
PAIN SEVERITY GOAL: 0/10
LOWEST PAIN SEVERITY IN PAST 24 HOURS: 0/10
PAIN: 1
PERSON REPORTING PAIN: PATIENT
STOOL FREQUENCY: LESS THAN DAILY
PAIN LOCATION - PAIN SEVERITY: 3/10
SUBJECTIVE PAIN PROGRESSION: UNCHANGED
DRY SKIN: 1
PAIN LOCATION: GENERAL

## 2023-11-27 NOTE — CASE COMMUNICATION
noted  ----- Message -----  From: Hali Shepherd R.N.  Sent: 11/26/2023  12:23 PM PST  To: Adriane Padilla R.N.; Nani Guillaume R.N.; *      Primary DX/skilled need: Exacerbation of chronic disease, Physical Debility  SN to observe,assess,teach,train and monitor medications and disease conditions.  SN Frequnecies:2w2,1w4,3 PRN  Zip Code:[id number]  Disciplines ordered:PT,SN,HHA,MSW (for eval for possiblity more help for CG or group home;  Team  to evaluate living arrangement  Insurance Authorizartion:[facility]  Certification Period:11/26/23-1/24/24  Special Considerations: CG Sheree expresses some burnout for her as she is unable to leave pt alone anymore.Fell out of her bed recently

## 2023-11-27 NOTE — CASE COMMUNICATION
noted  ----- Message -----  From: Olga Hong R.N.  Sent: 11/25/2023   2:11 PM PST  To: Adriane Padilla R.N.; Nani Guillaume R.N.      FYI    Patient contacted on call services to have nurse scheduled for visit.  RN let on call  know that that call should go to the administrative team to set up a date.  They will call admin on call.

## 2023-11-27 NOTE — PROGRESS NOTES
Medication chart review for [facility] services    Received referral from [facility].   Medications reviewed  compared with discharge summary if available.  Discharge summary date, if applicable:   11/17/23    Current medication list per [facility]     Medication list one, patient is currently taking    Current Outpatient Medications:     losartan, 50 mg, Oral, DAILY    QUEtiapine, 25 mg, Oral, BID    vitamin D2 (Ergocalciferol), 50,000 Units, Oral, Q7 DAYS    celecoxib, 100 mg, Oral, QAM    pantoprazole, 20 mg, Oral, QAM    hydrocortisone, 1 Application, Topical, BID PRN    mirtazapine, 7.5 mg, Oral, Nightly    ondansetron, 8 mg, Oral, Q8HRS PRN      Medication list two, drugs that the patient has been prescribed or recommended to take by their healthcare provider on discharge summary  START taking these medications         Instructions   cephALEXin 500 MG Caps  Start taking on: November 18, 2023  Commonly known as: Keflex    Take 1 Capsule by mouth 2 times a day for 2 days.  Dose: 500 mg      losartan 50 MG Tabs  Commonly known as: Cozaar    Take 1 Tablet by mouth every day for 30 days.  Dose: 50 mg                CONTINUE taking these medications         Instructions   celecoxib 100 MG Caps  Commonly known as: CeleBREX    Take 1 Capsule by mouth every morning.  Dose: 100 mg      hydrocortisone 2.5 % Crea topical cream    Doctor's comments: Apply small amount topically to hemorrhoid twice daily until resolved  Apply 1 Application topically 2 times a day as needed (hemorrhoid).  Dose: 1 Application      mirtazapine 7.5 MG tablet  Commonly known as: Remeron    Take 1 Tablet by mouth every evening. Indications: Memory  Dose: 7.5 mg      ondansetron 8 MG Tabs  Commonly known as: Zofran    Take 1 Tablet by mouth every 8 hours as needed for Nausea/Vomiting. Indications: Nausea  Dose: 8 mg      pantoprazole 20 MG tablet  Commonly known as: Protonix    Take 1 Tablet by mouth every morning. Indications:  Heartburn  Dose: 20 mg      QUEtiapine 25 MG Tabs  Commonly known as: SEROquel    Take 1 Tablet by mouth 2 times a day.  Dose: 25 mg      vitamin D2 (Ergocalciferol) 1.25 MG (28889 UT) Caps capsule  Commonly known as: Drisdol    Take 1 Capsule by mouth every 7 days. Indications: Vitamin D Deficiency  Dose: 50,000 Units          Allergies   Allergen Reactions    Sulfamethoxazole W-Trimethoprim Itching       Labs     Lab Results   Component Value Date/Time    SODIUM 141 11/17/2023 01:40 AM    POTASSIUM 3.5 (L) 11/17/2023 01:40 AM    CHLORIDE 107 11/17/2023 01:40 AM    CO2 20 11/17/2023 01:40 AM    GLUCOSE 92 11/17/2023 01:40 AM    BUN 30 (H) 11/17/2023 01:40 AM    CREATININE 1.08 11/17/2023 01:40 AM     Lab Results   Component Value Date/Time    ALKPHOSPHAT 60 11/16/2023 01:26 AM    ASTSGOT 13 11/16/2023 01:26 AM    ALTSGPT 8 11/16/2023 01:26 AM    TBILIRUBIN 0.3 11/16/2023 01:26 AM    ALBUMIN 3.3 11/16/2023 01:26 AM        Assessment for clinically significant drug interactions, drug omissions/additions, duplicative therapies.            CC   Kaity Guy P.A.-C.  [address] [address]  Fax: [phone]    [facility]  Phone [phone] fax [phone]    This note was created using voice recognition software (Dragon). The accuracy of the dictation is limited by the abilities of the software. I have reviewed the note prior to signing, however some errors in grammar and context are still possible. If you have any questions related to this note please do not hesitate to contact our office.

## 2023-11-28 ENCOUNTER — HOME CARE VISIT (OUTPATIENT)
Dept: HOME HEALTH SERVICES | Facility: HOME HEALTHCARE | Age: 83
End: 2023-11-28
Payer: MEDICARE

## 2023-11-28 VITALS
DIASTOLIC BLOOD PRESSURE: 62 MMHG | OXYGEN SATURATION: 95 % | TEMPERATURE: 98.4 F | HEART RATE: 83 BPM | SYSTOLIC BLOOD PRESSURE: 138 MMHG | RESPIRATION RATE: 16 BRPM

## 2023-11-28 VITALS
OXYGEN SATURATION: 95 % | HEART RATE: 83 BPM | RESPIRATION RATE: 16 BRPM | SYSTOLIC BLOOD PRESSURE: 138 MMHG | TEMPERATURE: 98.4 F | DIASTOLIC BLOOD PRESSURE: 62 MMHG

## 2023-11-28 VITALS
OXYGEN SATURATION: 95 % | SYSTOLIC BLOOD PRESSURE: 136 MMHG | HEART RATE: 63 BPM | DIASTOLIC BLOOD PRESSURE: 72 MMHG | TEMPERATURE: 97.7 F | RESPIRATION RATE: 16 BRPM

## 2023-11-28 PROCEDURE — G0299 HHS/HOSPICE OF RN EA 15 MIN: HCPCS

## 2023-11-28 PROCEDURE — G0151 HHCP-SERV OF PT,EA 15 MIN: HCPCS

## 2023-11-28 PROCEDURE — G0156 HHCP-SVS OF AIDE,EA 15 MIN: HCPCS

## 2023-11-28 ASSESSMENT — ENCOUNTER SYMPTOMS
PAIN: 1
PERSON REPORTING PAIN: PATIENT
POOR JUDGMENT: 1
MUSCLE WEAKNESS: 1
LOWEST PAIN SEVERITY IN PAST 24 HOURS: 3/10
PAIN LOCATION - PAIN FREQUENCY: INTERMITTENT
DIFFICULTY THINKING: 1
POOR JUDGMENT: 1
PAIN LOCATION - EXACERBATING FACTORS: WOUND CARE
PAIN SEVERITY GOAL: 0/10
HIGHEST PAIN SEVERITY IN PAST 24 HOURS: 2/10
PAIN LOCATION - RELIEVING FACTORS: REST, REPOSITIONING
PAIN LOCATION - PAIN DURATION: DAILY
PAIN LOCATION - PAIN SEVERITY: 6/10
BOWEL PATTERN NORMAL: 1
PERSON REPORTING PAIN: PATIENT
PERSON REPORTING PAIN: PATIENT
PAIN: 1
ARTHRALGIAS: 1
LAST BOWEL MOVEMENT: 66806
PAIN SEVERITY GOAL: 0/10
HIGHEST PAIN SEVERITY IN PAST 24 HOURS: 6/10
LIMITED RANGE OF MOTION: 1
PAIN LOCATION: GENERALIZED
DIFFICULTY THINKING: 1
STOOL FREQUENCY: DAILY
PAIN LOCATION - PAIN QUALITY: ACHING
MUSCLE WEAKNESS: 1
PAIN: 1

## 2023-11-28 ASSESSMENT — ACTIVITIES OF DAILY LIVING (ADL)
AMBULATION ASSISTANCE: NON-AMBULATORY
ADLS_COMMENTS: USE OF B UES TO ASSIST.
PHYSICAL TRANSFERS ASSESSED: 1
CURRENT_FUNCTION: MODERATE ASSIST
AMBULATION ASSISTANCE: NON-AMBULATORY
CURRENT_FUNCTION: MINIMUM ASSIST
CURRENT_FUNCTION: ONE PERSON

## 2023-11-28 ASSESSMENT — PAIN SCALES - PAIN ASSESSMENT IN ADVANCED DEMENTIA (PAINAD)
CONSOLABILITY: 2 - UNABLE TO CONSOLE, DISTRACT OR REASSURE.
BODYLANGUAGE: 2 - RIGID. FISTS CLENCHED. KNEES PULLED UP. PULLING OR PUSHING AWAY. STRIKING OUT.
TOTALSCORE: 10
NEGVOCALIZATION: 2 - REPEATED TROUBLE CALLING OUT. LOUD MOANING OR GROANING. CRYING.
FACIALEXPRESSION: 2 - FACIAL GRIMACING.

## 2023-11-29 NOTE — CASE COMMUNICATION
noted  ----- Message -----  From: Tracee Martinez, PT  Sent: 11/28/2023   2:56 PM PST  To: Adriane Padilla R.N.; Donal Durán; *      PT smitha completed, requesting auth for 1w4 effective 11/28/2023.

## 2023-11-30 ENCOUNTER — HOME CARE VISIT (OUTPATIENT)
Dept: HOME HEALTH SERVICES | Facility: HOME HEALTHCARE | Age: 83
End: 2023-11-30
Payer: MEDICARE

## 2023-11-30 PROBLEM — E87.6 HYPOKALEMIA: Status: ACTIVE | Noted: 2023-11-30

## 2023-11-30 PROCEDURE — G0155 HHCP-SVS OF CSW,EA 15 MIN: HCPCS

## 2023-11-30 ASSESSMENT — ENCOUNTER SYMPTOMS
DIFFICULTY THINKING: 1
POOR JUDGMENT: 1

## 2023-12-01 ENCOUNTER — HOME CARE VISIT (OUTPATIENT)
Dept: HOME HEALTH SERVICES | Facility: HOME HEALTHCARE | Age: 83
End: 2023-12-01
Payer: MEDICARE

## 2023-12-01 VITALS
HEART RATE: 78 BPM | DIASTOLIC BLOOD PRESSURE: 54 MMHG | TEMPERATURE: 98.5 F | RESPIRATION RATE: 18 BRPM | OXYGEN SATURATION: 93 % | SYSTOLIC BLOOD PRESSURE: 100 MMHG

## 2023-12-01 PROCEDURE — G0299 HHS/HOSPICE OF RN EA 15 MIN: HCPCS

## 2023-12-01 ASSESSMENT — ENCOUNTER SYMPTOMS
PAIN: 1
LAST BOWEL MOVEMENT: 66809
PERSON REPORTING PAIN: PATIENT
BOWEL PATTERN NORMAL: 1
STOOL FREQUENCY: DAILY
CONTUSION: 1
POOR JUDGMENT: 1

## 2023-12-01 ASSESSMENT — PAIN SCALES - PAIN ASSESSMENT IN ADVANCED DEMENTIA (PAINAD)
CONSOLABILITY: 1 - DISTRACTED OR REASSURED BY VOICE OR TOUCH.
FACIALEXPRESSION: 2 - FACIAL GRIMACING.
TOTALSCORE: 7
BODYLANGUAGE: 1 - TENSE. DISTRESSED PACING. FIDGETING.
NEGVOCALIZATION: 2 - REPEATED TROUBLE CALLING OUT. LOUD MOANING OR GROANING. CRYING.

## 2023-12-01 ASSESSMENT — ACTIVITIES OF DAILY LIVING (ADL)
BATHING_REQUIRES_ASSISTANCE: 1
AMBULATION_REQUIRES_ASSISTANCE: 1
SHOPPING_REQUIRES_ASSISTANCE: 1
DRESSING_REQUIRES_ASSISTANCE: 1
LAUNDRY_REQUIRES_ASSISTANCE: 1
GROOMING_REQUIRES_ASSISTANCE: 1
AMBULATION ASSISTANCE: NON-AMBULATORY
PHYSICAL_TRANSFER_REQUIRES_ASSISTANCE: 1
TOILETING_REQUIRES_ASSISTANCE: 1

## 2023-12-04 ENCOUNTER — HOME CARE VISIT (OUTPATIENT)
Dept: HOME HEALTH SERVICES | Facility: HOME HEALTHCARE | Age: 83
End: 2023-12-04
Payer: MEDICARE

## 2023-12-04 ASSESSMENT — ACTIVITIES OF DAILY LIVING (ADL): OASIS_M1830: 06

## 2023-12-04 NOTE — CASE COMMUNICATION
"Quality Review Completed for 11/26 SOC OASIS by JIMMIE Atwood RN on 12/4/2023:     Edits completed by JIMMIE Atwood RN:     1.  and  diagnosis coding updated per chart review  2.  changed to 11/26 for LSOC ordered  3. Wound care consult in patient reports \"Light purple non-blanching discoloration to coccyx - POA DTI\".  changed to yes and  is #1 F1.   4.  added #1 and #4 to risks for readmits per EMR reports  5. M 1740 changed to #1 and #2 and  to #5 per EMR reports  6.  changed to 3, per PT eval on 11/28, patient needs to use a talib for safe transfers, but refuses.  changed to 11/28 for collaboration  7.  changed to #6, per LA2163G and NJ0540 A is dependent  8. GW3426U changed to #1 dependent  9. SJ4557 B changed to #3 performance and #2 goal. C-H is dependent performance and #2 goals.   10.  VB2214 E-R is #2  11.  vazquez cation noted for antipsychotic Seroquel  12.  is yes per high risk med intervention is completed  13. Checked exercises prescribed to Activities Permitted on 485 form.    14.  D therapeutic diet for HTN management  15.  changed to 4 per therapy" Discharge- [facility] 1953, 74 y o  male MRN: [id number]    Unit/Bed#: E4 -01 Encounter: [id number]    Primary Care Provider: Chani Olivas MD   Date and time admitted to hospital: 7/5/2020  2:21 PM        * Hypertensive urgency  Assessment & Plan  Patient presents to ED with complaint of persistent hypertension with intermittent chest pain, fatigue, lower extremity cramping  /96 at time of presentation  Improved with addition of Norvasc 10 mg, will continue with losartan 100 mg daily with metoprolol XL 12 5 mg daily  CTA negative for acute abnormalities  Troponin negative x3  Cardiology consultation appreciated      Low back pain  Assessment & Plan  Follows outpatient with pain management  Recently had steroid injection in spine last week  Seems to be improved    GERD (gastroesophageal reflux disease)  Assessment & Plan  Continue PPI    Obesity (BMI 30 0-34  9)  Assessment & Plan  Patient notes he exercises for multiple hours daily  Would likely benefit from diet modification    BPH with obstruction/lower urinary tract symptoms  Assessment & Plan  Continue Flomax and Ditropan      Transition of Care Discharge Summary - [facility] Medicine    Patient Information: [facility] 74 y o  male MRN: [id number]  Unit/Bed#: E4 -01 Encounter: [id number]    Discharging Physician / Practitioner: Haja Carroll MD  PCP: Chani Olivas MD  Admission Date: 7/5/2020  Discharge Date: 07/07/20    Disposition:      Other: home      Reason for Admission: elevated BP     Discharge Diagnoses:     Principal Problem:    Hypertensive urgency  Active Problems:    BPH with obstruction/lower urinary tract symptoms    Obesity (BMI 30 0-34  9)    GERD (gastroesophageal reflux disease)    Low back pain    Left leg pain  Resolved Problems:    * No resolved hospital problems   *      Consultations During Hospital Stay:  · cardiology    Procedures Performed:     · none    Medication Adjustments and Discharge Medications:  · Medication Dosing Tapers - Please refer to Discharge Medication List for details on any medication dosing tapers (if applicable to patient)  · Discharge Medication List: See after visit summary for reconciled discharge medications  Wound Care Recommendations:  When applicable, please see wound care section of After Visit Summary  Diet Recommendations at Discharge:  Diet -        Diet Orders   (From admission, onward)             Start     Ordered    07/05/20 [facility]el [facility]  Room Service  Once     Question:  Type of Service  Answer:  Room Service-Appropriate    07/05/20 1857 07/05/20 1833  Diet Cardiovascular; Cardiac  Diet effective now     Question Answer Comment   Diet Type Cardiovascular    Cardiac Cardiac    RD to adjust diet per protocol? Yes        07/05/20 1832              Fluid Restriction - No Fluid Restriction at Discharge  Significant Findings / Test Results:     CTA:No acute vascular findings      Scattered small likely benign subpleural lung nodules versus nodular pleural thickening as detailed above  The findings are new and while likely benign should probably be reassessed in 6 months with repeat chest CT      Gallstones without evidence of cholecystitis      Colonic diverticulosis without diverticulitis      Single tiny right kidney stone of about 1 mm size      Degenerative changes of the spine and right hip  Left hip prosthesis appears grossly satisfactory  · Lower extremity Doppler:  Negative for DVT bilaterally    Echocardiogram:  Ejection fraction 41%, grade 1 diastolic dysfunction, wall motion consistent with conduction abnormality, mild aortic regurgitation, mild pulmonic regurgitation    Hospital Course:     Estee Clancy is a 74 y o  male patient who originally presented to the hospital on 7/5/2020 due to blood pressure  Patient history hypertension, chronic pain, GERD, BPH, CAD presenting with elevated blood pressure at home    Patient states for the past several weeks he has not been feeling well consisting of fatigue, intermittent chest pains, headaches  Admitted for hypertensive urgency with blood pressure 192/96 resumed on his home regimen and added Norvasc 5 mg with improvement, cardiology consulted  Patient's troponin negative x3, CT negative for any acute abnormalities  Norvasc increased to 10 mg  Patient underwent echocardiogram   He is otherwise stable and cleared from cardiology standpoint for discharge home today with outpatient follow-up    Please see above problem list for further details  Condition at Discharge: good     Discharge Day Visit / Exam:     Subjective:  Patient seen examined at bedside, states he feels better    Vitals: Blood Pressure: 156/83 (07/07/20 1611)  Pulse: 58 (07/07/20 1611)  Temperature: 98 3 °F (36 8 °C) (07/07/20 1611)  Temp Source: Tympanic (07/07/20 1611)  Respirations: 18 (07/07/20 1611)  Height: 5' 9" (175 3 cm) (07/1953)  Weight - Scale: 108 kg (238 lb 8 6 oz) (07/05/20 1808)  SpO2: 98 % (07/07/20 1611)    Physical Exam:    Constitutional: Patient is oriented to person, place and time, no acute distress  HEENT:  Normocephalic, atraumatic  Cardiovascular: Normal S1S2, RRR, No murmurs/rubs/gallops appreciated  Pulmonary:  Bilateral air entry, No rhonchi/rales/wheezing appreciated  Abdominal: Soft, Bowel sounds present, Non-tender, Non-distended  Extremities:  No cyanosis, clubbing or edema  Neurological: Cranial nerves II-XII grossly intact, sensation intact, otherwise no focal neurological symptoms  Discharge instructions/Information to patient and family:   See after visit summary section titled Discharge Instructions for information provided to patient and family  Planned Readmission: no     Discharge Statement:  I spent 30 minutes discharging the patient  This time was spent on the day of discharge  I had direct contact with the patient on the day of discharge   Greater than 50% of the total time was spent examining patient, answering all patient questions, arranging and discussing plan of care with patient as well as directly providing post-discharge instructions  Additional time then spent on discharge activities      ** Please Note: This note has been constructed using a voice recognition system **

## 2023-12-05 ENCOUNTER — HOME CARE VISIT (OUTPATIENT)
Dept: HOME HEALTH SERVICES | Facility: HOME HEALTHCARE | Age: 83
End: 2023-12-05
Payer: MEDICARE

## 2023-12-05 VITALS
SYSTOLIC BLOOD PRESSURE: 106 MMHG | RESPIRATION RATE: 18 BRPM | OXYGEN SATURATION: 97 % | HEART RATE: 66 BPM | DIASTOLIC BLOOD PRESSURE: 60 MMHG | TEMPERATURE: 97.4 F

## 2023-12-05 PROCEDURE — G0299 HHS/HOSPICE OF RN EA 15 MIN: HCPCS

## 2023-12-05 ASSESSMENT — ACTIVITIES OF DAILY LIVING (ADL): AMBULATION ASSISTANCE: NON-AMBULATORY

## 2023-12-05 ASSESSMENT — PAIN SCALES - PAIN ASSESSMENT IN ADVANCED DEMENTIA (PAINAD)
BODYLANGUAGE: 2 - RIGID. FISTS CLENCHED. KNEES PULLED UP. PULLING OR PUSHING AWAY. STRIKING OUT.
CONSOLABILITY: 1 - DISTRACTED OR REASSURED BY VOICE OR TOUCH.
TOTALSCORE: 7
FACIALEXPRESSION: 2 - FACIAL GRIMACING.
NEGVOCALIZATION: 1 - OCCASIONAL MOAN OR GROAN. LOW-LEVEL SPEECH WITH A NEGATIVE OR DISAPPROVING QUALITY.

## 2023-12-05 ASSESSMENT — ENCOUNTER SYMPTOMS
BOWEL PATTERN NORMAL: 1
LIMITED RANGE OF MOTION: 1
PAIN: 1
STOOL FREQUENCY: DAILY
PERSON REPORTING PAIN: PATIENT
MUSCLE WEAKNESS: 1
LAST BOWEL MOVEMENT: 66813

## 2023-12-05 NOTE — CASE COMMUNICATION
"I agree with these changes  ----- Message -----  From: Ledy Atwood R.N.  Sent: 12/4/2023  10:33 AM PST  To: Hali Shepherd R.N.      Quality Review Completed for 11/26 SOC OASIS by JIMMIE Atwood, RN on 12/4/2023:     Edits completed by JIMMIE Atwood RN:     1.  and  diagnosis coding updated per chart review  2.  changed to 11/26 for LSOC ordered  3. Wound care consult in patient reports \"Light purple non-blanching disco loration to coccyx - POA DTI\".  changed to yes and  is #1 F1.   4.  added #1 and #4 to risks for readmits per EMR reports  5.  changed to #1 and #2 and  to #5 per EMR reports  6.  changed to 3, per PT eval on 11/28, patient needs to use a talib for safe transfers, but refuses.  changed to 11/28 for collaboration  7.  changed to #6, per [id number] and AY6102 A is dependent  8. TW4187A changed to #1 de pendent  9. UK9732 B changed to #3 performance and #2 goal. C-H is dependent performance and #2 goals.   10.  HS6034 E-R is #2  11.  indication noted for antipsychotic Seroquel  12.  is yes per high risk med intervention is completed  13. Checked exercises prescribed to Activities Permitted on 485 form.    14.  D therapeutic diet for HTN management  15.  changed to 4 per therapy"

## 2023-12-06 ENCOUNTER — HOME CARE VISIT (OUTPATIENT)
Dept: HOME HEALTH SERVICES | Facility: HOME HEALTHCARE | Age: 83
End: 2023-12-06
Payer: MEDICARE

## 2023-12-06 PROCEDURE — G0155 HHCP-SVS OF CSW,EA 15 MIN: HCPCS

## 2023-12-06 ASSESSMENT — ACTIVITIES OF DAILY LIVING (ADL)
BATHING_REQUIRES_ASSISTANCE: 1
SHOPPING_REQUIRES_ASSISTANCE: 1
LAUNDRY_REQUIRES_ASSISTANCE: 1
PHYSICAL_TRANSFER_REQUIRES_ASSISTANCE: 1
GROOMING_REQUIRES_ASSISTANCE: 1
DRESSING_REQUIRES_ASSISTANCE: 1
AMBULATION_REQUIRES_ASSISTANCE: 1
TOILETING_REQUIRES_ASSISTANCE: 1

## 2023-12-06 ASSESSMENT — ENCOUNTER SYMPTOMS
POOR JUDGMENT: 1
POOR JUDGMENT: 1
DIFFICULTY THINKING: 1
DIFFICULTY THINKING: 1

## 2023-12-08 ENCOUNTER — HOME CARE VISIT (OUTPATIENT)
Dept: HOME HEALTH SERVICES | Facility: HOME HEALTHCARE | Age: 83
End: 2023-12-08
Payer: MEDICARE

## 2023-12-08 VITALS
OXYGEN SATURATION: 97 % | SYSTOLIC BLOOD PRESSURE: 154 MMHG | DIASTOLIC BLOOD PRESSURE: 62 MMHG | HEART RATE: 76 BPM | TEMPERATURE: 98.1 F

## 2023-12-08 PROCEDURE — G0156 HHCP-SVS OF AIDE,EA 15 MIN: HCPCS

## 2023-12-08 PROCEDURE — G0151 HHCP-SERV OF PT,EA 15 MIN: HCPCS

## 2023-12-08 PROCEDURE — G0299 HHS/HOSPICE OF RN EA 15 MIN: HCPCS

## 2023-12-08 NOTE — Clinical Note
"Pt sitted in wheelchair , alert and oriented x2. caregiver present, pt had shower earlier. Pt informed of pending lab draw order per epic. She refused and said she is in good shape.  Uncooperative,she refused wound care to all wounds. dressings intact .pt informed of the importance of assessemnt , wound care to prevent complications such as wound infections. pt slightly agiated and said \"it's already late\". "

## 2023-12-09 NOTE — CASE COMMUNICATION
noted  ----- Message -----  From: WESTON Mckeon, ALBERTINA  Sent: 12/8/2023   3:05 PM PST  To: Adriane Padilla R.N.; *      [facility] Access application completed and submitted via secure email.

## 2023-12-10 VITALS
HEART RATE: 68 BPM | OXYGEN SATURATION: 96 % | DIASTOLIC BLOOD PRESSURE: 68 MMHG | SYSTOLIC BLOOD PRESSURE: 132 MMHG | RESPIRATION RATE: 16 BRPM | TEMPERATURE: 97.5 F

## 2023-12-10 VITALS
RESPIRATION RATE: 18 BRPM | SYSTOLIC BLOOD PRESSURE: 128 MMHG | OXYGEN SATURATION: 97 % | TEMPERATURE: 98.1 F | HEART RATE: 76 BPM | DIASTOLIC BLOOD PRESSURE: 66 MMHG

## 2023-12-10 ASSESSMENT — ENCOUNTER SYMPTOMS
PERSON REPORTING PAIN: PATIENT
STOOL FREQUENCY: LESS THAN DAILY
DENIES PAIN: 1
POOR JUDGMENT: 1
LAST BOWEL MOVEMENT: 66816
LIMITED RANGE OF MOTION: 1
BOWEL PATTERN NORMAL: 1
DRY SKIN: 1
FATIGUES EASILY: 1
MUSCLE WEAKNESS: 1
PERSON REPORTING PAIN: PATIENT
DIFFICULTY THINKING: 1
POOR JUDGMENT: 1
DENIES PAIN: 1

## 2023-12-10 ASSESSMENT — PAIN SCALES - PAIN ASSESSMENT IN ADVANCED DEMENTIA (PAINAD)
CONSOLABILITY: 0 - NO NEED TO CONSOLE.
BODYLANGUAGE: 0 - RELAXED.
NEGVOCALIZATION: 0 - NONE.
FACIALEXPRESSION: 0 - SMILING OR INEXPRESSIVE.
TOTALSCORE: 0

## 2023-12-10 ASSESSMENT — ACTIVITIES OF DAILY LIVING (ADL)
AMBULATION ASSISTANCE: ONE PERSON
CURRENT_FUNCTION: ONE PERSON

## 2023-12-11 NOTE — CASE COMMUNICATION
"noted  ----- Message -----  From: Ashley Schaffer R.N.  Sent: 12/10/2023  11:28 AM PST  To: Adriane Padilla R.N.; Nani Guillaume R.N.; *      Pt sitted in wheelchair , alert and oriented x2. caregiver present, pt had shower earlier. Pt informed of pending lab draw order per epic. She refused and said she is in good shape.  Uncooperative,she refused wound care to all wounds. dressings intact .pt informed of the importance of assessemnt ,  wound care to prevent complications such as wound infections. pt slightly agiated and said \"it's already late\". "

## 2023-12-12 ENCOUNTER — HOME CARE VISIT (OUTPATIENT)
Dept: HOME HEALTH SERVICES | Facility: HOME HEALTHCARE | Age: 83
End: 2023-12-12
Payer: MEDICARE

## 2023-12-12 VITALS
DIASTOLIC BLOOD PRESSURE: 66 MMHG | TEMPERATURE: 99.1 F | RESPIRATION RATE: 16 BRPM | SYSTOLIC BLOOD PRESSURE: 118 MMHG | HEART RATE: 63 BPM | OXYGEN SATURATION: 97 %

## 2023-12-12 VITALS
OXYGEN SATURATION: 97 % | SYSTOLIC BLOOD PRESSURE: 116 MMHG | DIASTOLIC BLOOD PRESSURE: 66 MMHG | HEART RATE: 63 BPM | TEMPERATURE: 99.1 F | RESPIRATION RATE: 16 BRPM

## 2023-12-12 PROCEDURE — G0152 HHCP-SERV OF OT,EA 15 MIN: HCPCS

## 2023-12-12 PROCEDURE — G0299 HHS/HOSPICE OF RN EA 15 MIN: HCPCS

## 2023-12-12 ASSESSMENT — ENCOUNTER SYMPTOMS
LAST BOWEL MOVEMENT: 66820
VOMITING: DENIES
POOR JUDGMENT: 1
DIFFICULTY THINKING: 1
PERSON REPORTING PAIN: PATIENT
NAUSEA: DENIES
PAIN: DENIES ANY PAIN OR DISCOMFORT AT TIME OF NURSING VISIT.
BOWEL PATTERN NORMAL: 1
DENIES PAIN: 1

## 2023-12-12 NOTE — Clinical Note
History pertinent to today's visit: dementia, recurrent UTI, CKD stage III, hypertension, rheumatoid arthritis, wound care.  caregiver present for visit.   Wound care to bilateral arms- wounds healing. Pictures and measurement taken. No wound found to coccyx area- wound man resolved.  Next nursing visit 12/15

## 2023-12-13 ENCOUNTER — HOME CARE VISIT (OUTPATIENT)
Dept: HOME HEALTH SERVICES | Facility: HOME HEALTHCARE | Age: 83
End: 2023-12-13
Payer: MEDICARE

## 2023-12-13 NOTE — CASE COMMUNICATION
noted  ----- Message -----  From: Xiomara Alarcno R.N.  Sent: 12/12/2023   5:14 PM PST  To: Adriane Padilla R.N.      History pertinent to today's visit: dementia, recurrent UTI, CKD stage III, hypertension, rheumatoid arthritis, wound care.  caregiver present for visit.   Wound care to bilateral arms- wounds healing. Pictures and measurement taken. No wound found to coccyx area- wound man resolved.  Next nursing visit 12/15

## 2023-12-13 NOTE — CASE COMMUNICATION
Patient discussed today in interdisciplinary team meeting. Concerns noted of needs 2 person assist, CG exhausted. Plan to address issue is son has info re paid caregiver. Anticipate with need group home level of care in near future

## 2023-12-14 ASSESSMENT — ENCOUNTER SYMPTOMS
PAIN LOCATION: GENERALIZED
DEBILITATING PAIN: 1
PAIN: 1
PERSON REPORTING PAIN: PATIENT
DIFFICULTY THINKING: 1

## 2023-12-15 ENCOUNTER — HOME CARE VISIT (OUTPATIENT)
Dept: HOME HEALTH SERVICES | Facility: HOME HEALTHCARE | Age: 83
End: 2023-12-15
Payer: MEDICARE

## 2023-12-15 VITALS
RESPIRATION RATE: 16 BRPM | DIASTOLIC BLOOD PRESSURE: 68 MMHG | OXYGEN SATURATION: 96 % | TEMPERATURE: 98.5 F | HEART RATE: 72 BPM | SYSTOLIC BLOOD PRESSURE: 112 MMHG

## 2023-12-15 PROCEDURE — G0152 HHCP-SERV OF OT,EA 15 MIN: HCPCS

## 2023-12-15 PROCEDURE — G0156 HHCP-SVS OF AIDE,EA 15 MIN: HCPCS

## 2023-12-15 PROCEDURE — G0299 HHS/HOSPICE OF RN EA 15 MIN: HCPCS

## 2023-12-15 ASSESSMENT — ACTIVITIES OF DAILY LIVING (ADL)
TOILETING: MODERATE ASSIST
TOILETING: 1
DRESSING_LB_CURRENT_FUNCTION: MODERATE ASSIST
DRESSING_UB_CURRENT_FUNCTION: MINIMUM ASSIST
CURRENT_FUNCTION: MINIMUM ASSIST
PHYSICAL TRANSFERS ASSESSED: 1

## 2023-12-15 NOTE — Clinical Note
"Pt in bed when sn came alert disoriented and confused . She keeps on saying she wants to go home take care of her children, reoriented that she is in her own house. caregiver assisted pt up in wheelchair , reported that urine is less odorous this morning ,last bowel movement 2 days ago. Pt uncooperative this morning, unable to assess wounds ,pt refused wound care , getting agitated , saying \"only the doctor can touch it\". caregiver and this nurse tried to explained that I am a RN here to check her and do wound care also. Pt said\" where is your credentials\", tried to show my ID , still didn't want arms touch as she would pull arms away. Sheree caregiver reported pt is non compliant most of the time. Pt/Cg response to the services provided: reports no falls ."

## 2023-12-15 NOTE — Clinical Note
Patient/caregiver requested visit to be rescheduled for next week due to having multiple visits in one day.  Patient scheduled to be discharged next week.

## 2023-12-16 NOTE — CASE COMMUNICATION
noted  ----- Message -----  From: Kaity Guy P.A.-C.  Sent: 12/15/2023   2:25 PM PST  To: Adriane Padilla R.N.; Nani Guillaume R.N.; *  Subject: Update                                             I agree.  She needs higher level of care than what is being provided. Thank you    ----- Message -----  From: Adriane Padilla R.N.  Sent: 12/13/2023  12:26 PM PST  To: Nani Guillaume R.N.; Kaity Guy P.A.-C.    Patient discussed today in in terdisciplinary team meeting. Concerns noted of needs 2 person assist, CG exhausted. Plan to address issue is son has info re paid caregiver. Anticipate with need group home level of care in near future

## 2023-12-16 NOTE — CASE COMMUNICATION
noted  ----- Message -----  From: Loretta Bowens, OT  Sent: 12/15/2023  12:53 AM PST  To: Adriane Padilla R.N.; Nani Guillaume R.N.; *      OT smitha completed, requesting auth for  2w3, effective 12/12/23.

## 2023-12-17 VITALS — TEMPERATURE: 98.4 F | HEART RATE: 74 BPM | RESPIRATION RATE: 17 BRPM | OXYGEN SATURATION: 97 %

## 2023-12-17 ASSESSMENT — PAIN SCALES - PAIN ASSESSMENT IN ADVANCED DEMENTIA (PAINAD)
FACIALEXPRESSION: 0 - SMILING OR INEXPRESSIVE.
BODYLANGUAGE: 0 - RELAXED.
NEGVOCALIZATION: 0 - NONE.
TOTALSCORE: 0
CONSOLABILITY: 0 - NO NEED TO CONSOLE.

## 2023-12-17 ASSESSMENT — ENCOUNTER SYMPTOMS
BOWEL PATTERN NORMAL: 1
POOR JUDGMENT: 1
LAST BOWEL MOVEMENT: 66821
LIMITED RANGE OF MOTION: 1
MUSCLE WEAKNESS: 1
STOOL FREQUENCY: LESS THAN DAILY
FATIGUES EASILY: 1
DRY SKIN: 1
DENIES PAIN: 1
PERSON REPORTING PAIN: PATIENT

## 2023-12-17 ASSESSMENT — ACTIVITIES OF DAILY LIVING (ADL)
AMBULATION ASSISTANCE: ONE PERSON
CURRENT_FUNCTION: ONE PERSON

## 2023-12-18 ENCOUNTER — HOME CARE VISIT (OUTPATIENT)
Dept: HOME HEALTH SERVICES | Facility: HOME HEALTHCARE | Age: 83
End: 2023-12-18
Payer: MEDICARE

## 2023-12-18 VITALS
OXYGEN SATURATION: 95 % | TEMPERATURE: 99.5 F | DIASTOLIC BLOOD PRESSURE: 70 MMHG | SYSTOLIC BLOOD PRESSURE: 136 MMHG | HEART RATE: 81 BPM

## 2023-12-18 PROCEDURE — G0152 HHCP-SERV OF OT,EA 15 MIN: HCPCS

## 2023-12-18 ASSESSMENT — ENCOUNTER SYMPTOMS
POOR JUDGMENT: 1
DIFFICULTY THINKING: 1

## 2023-12-18 NOTE — CASE COMMUNICATION
"noted  ----- Message -----  From: Kaity Guy P.A.-C.  Sent: 12/17/2023   5:56 PM PST  To: Adriane Padilla R.N.; Nani Guillaume R.N.; *  Subject: Update, concerns                                   Patient appears to be less cooperative as time goes on.  Would advise repeat UA with C&S if it can be obtained.  If not, then will prescribe cefdinir 300mg PO BID x 5 days for prophylactic treatment of possible UTI. Recommend increase of sero quel dose from 25mg BID to 50mg BID.   MSW/LSW note from 12/6/23 reviewed.  Patient needs higher level of care in an [facility] or .  Shahana, please coordinate orders with HH and pharmacy, and notify patient's son of recommendations.  Thank you    ----- Message -----  From: Ashley Schaffer R.N.  Sent: 12/17/2023  12:55 PM PST  To: Adriane Padilla R.N.; Nani Guillaume R.N.; *    Pt in bed when sn came alert disoriented and confused . She keeps  on saying she wants to go home take care of her children, reoriented that she is in her own house. caregiver assisted pt up in wheelchair , reported that urine is less odorous this morning ,last bowel movement 2 days ago. Pt uncooperative this morning, unable to assess wounds ,pt refused wound care , getting agitated , saying \"only the doctor can touch it\". caregiver and this nurse tried to explained that I am a RN here to check her an d do wound care also. Pt said\" where is your credentials\", tried to show my ID , still didn't want arms touch as she would pull arms away. Sheree caregiver reported pt is non compliant most of the time. Pt/Cg response to the services provided: reports no falls .  "

## 2023-12-18 NOTE — CASE COMMUNICATION
"noted  ----- Message -----  From: Ashley Schaffer R.N.  Sent: 12/17/2023  12:55 PM PST  To: Adriane Padilla R.N.; Nani Guillaume R.N.; *      Pt in bed when sn came alert disoriented and confused . She keeps on saying she wants to go home take care of her children, reoriented that she is in her own house. caregiver assisted pt up in wheelchair , reported that urine is less odorous this morning ,last bowel movement 2 days ago. Pt uncoope rative this morning, unable to assess wounds ,pt refused wound care , getting agitated , saying \"only the doctor can touch it\". caregiver and this nurse tried to explained that I am a RN here to check her and do wound care also. Pt said\" where is your credentials\", tried to show my ID , still didn't want arms touch as she would pull arms away. Sheree caregiver reported pt is non compliant most of the time. Pt/Cg response to the services  provided: reports no falls ."

## 2023-12-19 ENCOUNTER — HOSPITAL ENCOUNTER (OUTPATIENT)
Facility: MEDICAL CENTER | Age: 83
End: 2023-12-19
Attending: PHYSICIAN ASSISTANT
Payer: MEDICARE

## 2023-12-19 ENCOUNTER — HOME CARE VISIT (OUTPATIENT)
Dept: HOME HEALTH SERVICES | Facility: HOME HEALTHCARE | Age: 83
End: 2023-12-19
Payer: MEDICARE

## 2023-12-19 VITALS
DIASTOLIC BLOOD PRESSURE: 66 MMHG | HEART RATE: 87 BPM | RESPIRATION RATE: 16 BRPM | OXYGEN SATURATION: 90 % | SYSTOLIC BLOOD PRESSURE: 150 MMHG | TEMPERATURE: 97.5 F

## 2023-12-19 LAB
ANION GAP SERPL CALC-SCNC: 11 MMOL/L (ref 7–16)
APPEARANCE UR: ABNORMAL
BACTERIA #/AREA URNS HPF: ABNORMAL /HPF
BILIRUB UR QL STRIP.AUTO: NEGATIVE
BUN SERPL-MCNC: 22 MG/DL (ref 8–22)
CALCIUM SERPL-MCNC: 8.9 MG/DL (ref 8.5–10.5)
CHLORIDE SERPL-SCNC: 109 MMOL/L (ref 96–112)
CO2 SERPL-SCNC: 23 MMOL/L (ref 20–33)
COLOR UR: YELLOW
CREAT SERPL-MCNC: 1.11 MG/DL (ref 0.5–1.4)
EPI CELLS #/AREA URNS HPF: NEGATIVE /HPF
GFR SERPLBLD CREATININE-BSD FMLA CKD-EPI: 49 ML/MIN/1.73 M 2
GLUCOSE SERPL-MCNC: 120 MG/DL (ref 65–99)
GLUCOSE UR STRIP.AUTO-MCNC: NEGATIVE MG/DL
HYALINE CASTS #/AREA URNS LPF: ABNORMAL /LPF
KETONES UR STRIP.AUTO-MCNC: NEGATIVE MG/DL
LEUKOCYTE ESTERASE UR QL STRIP.AUTO: ABNORMAL
MICRO URNS: ABNORMAL
NITRITE UR QL STRIP.AUTO: POSITIVE
PH UR STRIP.AUTO: 6 [PH] (ref 5–8)
POTASSIUM SERPL-SCNC: 3.8 MMOL/L (ref 3.6–5.5)
PROT UR QL STRIP: NEGATIVE MG/DL
RBC # URNS HPF: ABNORMAL /HPF
RBC UR QL AUTO: NEGATIVE
SODIUM SERPL-SCNC: 143 MMOL/L (ref 135–145)
SP GR UR STRIP.AUTO: 1.02
UROBILINOGEN UR STRIP.AUTO-MCNC: 0.2 MG/DL
WBC #/AREA URNS HPF: ABNORMAL /HPF

## 2023-12-19 PROCEDURE — 80048 BASIC METABOLIC PNL TOTAL CA: CPT

## 2023-12-19 PROCEDURE — 87077 CULTURE AEROBIC IDENTIFY: CPT | Mod: 91

## 2023-12-19 PROCEDURE — 81001 URINALYSIS AUTO W/SCOPE: CPT

## 2023-12-19 PROCEDURE — G0299 HHS/HOSPICE OF RN EA 15 MIN: HCPCS

## 2023-12-19 PROCEDURE — 87086 URINE CULTURE/COLONY COUNT: CPT

## 2023-12-19 PROCEDURE — 87186 SC STD MICRODIL/AGAR DIL: CPT

## 2023-12-19 ASSESSMENT — ENCOUNTER SYMPTOMS: POOR JUDGMENT: 1

## 2023-12-19 NOTE — CASE COMMUNICATION
BMP & UA obtained. Urine with moderate cloudiness. Skin tears healed. Cooperative today. Anticipate discharge next week

## 2023-12-20 ASSESSMENT — ENCOUNTER SYMPTOMS
MUSCLE WEAKNESS: 1
STOOL FREQUENCY: DAILY
LAST BOWEL MOVEMENT: 66827
HYPERTENSION: 1
BOWEL PATTERN NORMAL: 1
DENIES PAIN: 1
DIFFICULTY THINKING: 1
DENIES PAIN: 1

## 2023-12-20 ASSESSMENT — ACTIVITIES OF DAILY LIVING (ADL)
CURRENT_FUNCTION: ONE PERSON
AMBULATION ASSISTANCE: ONE PERSON

## 2023-12-21 ENCOUNTER — HOME CARE VISIT (OUTPATIENT)
Dept: HOME HEALTH SERVICES | Facility: HOME HEALTHCARE | Age: 83
End: 2023-12-21
Payer: MEDICARE

## 2023-12-21 LAB
BACTERIA UR CULT: ABNORMAL
BACTERIA UR CULT: ABNORMAL
SIGNIFICANT IND 70042: ABNORMAL
SITE SITE: ABNORMAL
SOURCE SOURCE: ABNORMAL

## 2023-12-21 PROCEDURE — G0151 HHCP-SERV OF PT,EA 15 MIN: HCPCS

## 2023-12-21 NOTE — Clinical Note
Patient discharged from home health PT effective 12/21/2023 with all goals met.  NMNC issued for 12/29/2023.

## 2023-12-22 ENCOUNTER — HOME CARE VISIT (OUTPATIENT)
Dept: HOME HEALTH SERVICES | Facility: HOME HEALTHCARE | Age: 83
End: 2023-12-22
Payer: MEDICARE

## 2023-12-22 PROCEDURE — G0152 HHCP-SERV OF OT,EA 15 MIN: HCPCS

## 2023-12-23 ASSESSMENT — ENCOUNTER SYMPTOMS
POOR JUDGMENT: 1
DENIES PAIN: 1
DIFFICULTY THINKING: 1

## 2023-12-25 VITALS
HEART RATE: 81 BPM | RESPIRATION RATE: 16 BRPM | TEMPERATURE: 98.9 F | OXYGEN SATURATION: 93 % | SYSTOLIC BLOOD PRESSURE: 125 MMHG | DIASTOLIC BLOOD PRESSURE: 72 MMHG

## 2023-12-25 ASSESSMENT — ENCOUNTER SYMPTOMS
DENIES PAIN: 1
PERSON REPORTING PAIN: PATIENT
POOR JUDGMENT: 1
DIFFICULTY THINKING: 1

## 2023-12-26 NOTE — CASE COMMUNICATION
noted  ----- Message -----  From: Rosendo Finley, PT  Sent: 12/25/2023  11:34 PM PST  To: Adriane Padilla R.N.      Patient/caregiver requested visit to be rescheduled for next week due to having multiple visits in one day.  Patient scheduled to be discharged next week. Statement Selected

## 2023-12-26 NOTE — CASE COMMUNICATION
noted  ----- Message -----  From: Rosendo Finley, PT  Sent: 12/25/2023  11:34 PM PST  To: Adriane Padilla R.N.      Patient discharged from home health PT effective 12/21/2023 with all goals met.  NMNC issued for 12/29/2023.

## 2023-12-27 ENCOUNTER — HOME CARE VISIT (OUTPATIENT)
Dept: HOME HEALTH SERVICES | Facility: HOME HEALTHCARE | Age: 83
End: 2023-12-27
Payer: MEDICARE

## 2023-12-27 VITALS
TEMPERATURE: 97.7 F | SYSTOLIC BLOOD PRESSURE: 144 MMHG | RESPIRATION RATE: 18 BRPM | OXYGEN SATURATION: 98 % | HEART RATE: 79 BPM | DIASTOLIC BLOOD PRESSURE: 70 MMHG

## 2023-12-27 PROCEDURE — 665001 SOC-HOME HEALTH

## 2023-12-27 PROCEDURE — G0152 HHCP-SERV OF OT,EA 15 MIN: HCPCS

## 2023-12-28 ASSESSMENT — ENCOUNTER SYMPTOMS
DENIES PAIN: 1
DIFFICULTY THINKING: 1

## 2023-12-29 ENCOUNTER — HOME CARE VISIT (OUTPATIENT)
Dept: HOME HEALTH SERVICES | Facility: HOME HEALTHCARE | Age: 83
End: 2023-12-29
Payer: MEDICARE

## 2023-12-29 VITALS
TEMPERATURE: 99.5 F | OXYGEN SATURATION: 100 % | SYSTOLIC BLOOD PRESSURE: 136 MMHG | RESPIRATION RATE: 16 BRPM | DIASTOLIC BLOOD PRESSURE: 70 MMHG | HEART RATE: 79 BPM

## 2023-12-29 VITALS
RESPIRATION RATE: 16 BRPM | HEART RATE: 79 BPM | TEMPERATURE: 98.2 F | SYSTOLIC BLOOD PRESSURE: 136 MMHG | DIASTOLIC BLOOD PRESSURE: 70 MMHG | OXYGEN SATURATION: 100 %

## 2023-12-29 PROCEDURE — G0152 HHCP-SERV OF OT,EA 15 MIN: HCPCS

## 2023-12-29 PROCEDURE — G0156 HHCP-SVS OF AIDE,EA 15 MIN: HCPCS

## 2023-12-29 PROCEDURE — G0493 RN CARE EA 15 MIN HH/HOSPICE: HCPCS

## 2023-12-29 ASSESSMENT — ENCOUNTER SYMPTOMS
PAIN SEVERITY GOAL: 0/10
PERSON REPORTING PAIN: PATIENT
DENIES PAIN: 1
HIGHEST PAIN SEVERITY IN PAST 24 HOURS: 0/10

## 2023-12-29 NOTE — Clinical Note
OT DISCHAGE SUMMARY:    The patient was seen for 6 home health occupational  therapy sessions. Goals partially met, as pt's cognition and behavior are main limiting factors to progress with therapy. Pt is unable to maintain consistent carryover of all techniques provided. However, pt's caregiver did report increased pt participation in ADL txfs, as a result of OT sessions. The patient was discharged to caregiver/family.  .  STATUS AT DISCHARGE:  Ambulation and Mobility: Min Assist  Patient Equipment: wheelchair for ambulation.  Transferring: Min Assist  Bathing: Mod Assist  Dressing: Max Assist  Special equipment used: none  Medication management: administered by another person    Frequency of pain interfering with activity or movement: (drop downs)  - No pain and - Less often than daily    When is the patient dyspneic or noticeably Short of Breath: (drop downs)  - Patient is not short of breath

## 2023-12-30 VITALS
RESPIRATION RATE: 16 BRPM | OXYGEN SATURATION: 100 % | DIASTOLIC BLOOD PRESSURE: 70 MMHG | HEART RATE: 79 BPM | TEMPERATURE: 98.2 F | SYSTOLIC BLOOD PRESSURE: 136 MMHG

## 2023-12-30 ASSESSMENT — ENCOUNTER SYMPTOMS
BOWEL PATTERN NORMAL: 1
LIMITED RANGE OF MOTION: 1
MUSCLE WEAKNESS: 1
STOOL FREQUENCY: DAILY
POOR JUDGMENT: 1
LAST BOWEL MOVEMENT: 66837
DIFFICULTY THINKING: 1
DENIES PAIN: 1

## 2023-12-30 ASSESSMENT — ACTIVITIES OF DAILY LIVING (ADL)
AMBULATION ASSISTANCE: NON-AMBULATORY
CURRENT_FUNCTION: ONE PERSON

## 2023-12-31 ASSESSMENT — ENCOUNTER SYMPTOMS
PERSON REPORTING PAIN: PATIENT
DENIES PAIN: 1

## 2024-01-02 NOTE — CASE COMMUNICATION
noted  ----- Message -----  From: Loretta Bowens OT  Sent: 12/31/2023  10:34 PM PST  To: Adriane Padilla R.N.; Nani Guillaume R.N.      OT DISCHAGE SUMMARY:    The patient was seen for 6 home health occupational  therapy sessions. Goals partially met, as pt's cognition and behavior are main limiting factors to progress with therapy. Pt is unable to maintain consistent carryover of all techniques provided. However, pt's caregiver di d report increased pt participation in ADL txfs, as a result of OT sessions. The patient was discharged to caregiver/family.  .  STATUS AT DISCHARGE:  Ambulation and Mobility: Min Assist  Patient Equipment: wheelchair for ambulation.  Transferring: Min Assist  Bathing: Mod Assist  Dressing: Max Assist  Special equipment used: none  Medication management: administered by another person    Frequency of pain interfering with activity or mo vement: (drop downs)  - No pain and - Less often than daily    When is the patient dyspneic or noticeably Short of Breath: (drop downs)  - Patient is not short of breath

## 2024-01-03 ENCOUNTER — HOME CARE VISIT (OUTPATIENT)
Dept: HOME HEALTH SERVICES | Facility: HOME HEALTHCARE | Age: 84
End: 2024-01-03
Payer: MEDICARE

## 2024-01-03 PROBLEM — N18.31 STAGE 3A CHRONIC KIDNEY DISEASE: Status: ACTIVE | Noted: 2024-01-03

## 2024-01-03 PROCEDURE — G0493 RN CARE EA 15 MIN HH/HOSPICE: HCPCS

## 2024-01-04 PROBLEM — I10 HTN (HYPERTENSION): Status: ACTIVE | Noted: 2024-01-04

## 2024-01-04 PROBLEM — N18.9 CKD (CHRONIC KIDNEY DISEASE): Status: ACTIVE | Noted: 2024-01-04

## 2024-01-05 ENCOUNTER — HOME CARE VISIT (OUTPATIENT)
Dept: HOME HEALTH SERVICES | Facility: HOME HEALTHCARE | Age: 84
End: 2024-01-05
Payer: MEDICARE

## 2024-01-05 ASSESSMENT — ENCOUNTER SYMPTOMS
PAIN: 1
AGITATION: 1
LAST BOWEL MOVEMENT: 66842
BOWEL PATTERN NORMAL: 1
POOR JUDGMENT: 1
PERSON REPORTING PAIN: CAREGIVER
DIFFICULTY THINKING: 1
PAIN LOCATION: JOINTS

## 2024-01-05 ASSESSMENT — PAIN SCALES - PAIN ASSESSMENT IN ADVANCED DEMENTIA (PAINAD)
TOTALSCORE: 1
NEGVOCALIZATION: 1 - OCCASIONAL MOAN OR GROAN. LOW-LEVEL SPEECH WITH A NEGATIVE OR DISAPPROVING QUALITY.
BODYLANGUAGE: 0 - RELAXED.
FACIALEXPRESSION: 0 - SMILING OR INEXPRESSIVE.
CONSOLABILITY: 0 - NO NEED TO CONSOLE.

## 2024-01-05 ASSESSMENT — ACTIVITIES OF DAILY LIVING (ADL)
OASIS_M1830: 03
HOME_HEALTH_OASIS: 01

## 2024-01-09 ENCOUNTER — HOME CARE VISIT (OUTPATIENT)
Dept: HOME HEALTH SERVICES | Facility: HOME HEALTHCARE | Age: 84
End: 2024-01-09
Payer: MEDICARE

## 2024-01-09 NOTE — CASE COMMUNICATION
I agree with changes  ----- Message -----  From: Leonora Suárez R.N.  Sent: 1/9/2024   6:43 AM PST  To: Adriane Padilla R.N.      Quality Review for 1.3.24 DC OASIS performed on by MANAN Suárez RN on 1.7.2024:    Edits completed by MANAN Suárez RN:  1. Added indication to  per MAR  2. Changed  to 5 per RD1973 R response of 1 and narrative  3. Changed  to 1, yes per chart review pt was prescribed axb on 12.22.23 for a UTI

## 2024-01-09 NOTE — CASE COMMUNICATION
Quality Review for 1.3.24 DC OASIS performed on by MANAN Suárez RN on 1.7.2024:    Edits completed by MANAN Suárez RN:  1. Added indication to  per MAR  2. Changed  to 5 per [id number] R response of 1 and narrative  3. Changed  to 1, yes per chart review pt was prescribed axb on 12.22.23 for a UTI

## 2024-01-19 ENCOUNTER — HOSPITAL ENCOUNTER (OUTPATIENT)
Facility: MEDICAL CENTER | Age: 84
End: 2024-01-19
Attending: NURSE PRACTITIONER
Payer: MEDICARE

## 2024-01-19 DIAGNOSIS — Z20.1 TUBERCULOSIS EXPOSURE: ICD-10-CM

## 2024-01-19 PROCEDURE — 86480 TB TEST CELL IMMUN MEASURE: CPT

## 2024-01-22 LAB
GAMMA INTERFERON BACKGROUND BLD IA-ACNC: 0.08 IU/ML
M TB IFN-G BLD-IMP: NEGATIVE
M TB IFN-G CD4+ BCKGRND COR BLD-ACNC: -0.02 IU/ML
MITOGEN IGNF BCKGRD COR BLD-ACNC: >10 IU/ML
QFT TB2 - NIL TBQ2: -0.01 IU/ML